# Patient Record
Sex: FEMALE | Race: WHITE | Employment: OTHER | URBAN - NONMETROPOLITAN AREA
[De-identification: names, ages, dates, MRNs, and addresses within clinical notes are randomized per-mention and may not be internally consistent; named-entity substitution may affect disease eponyms.]

---

## 2017-10-27 ENCOUNTER — APPOINTMENT (OUTPATIENT)
Dept: CT IMAGING | Age: 82
DRG: 199 | End: 2017-10-27
Payer: MEDICARE

## 2017-10-27 ENCOUNTER — HOSPITAL ENCOUNTER (INPATIENT)
Age: 82
LOS: 5 days | Discharge: NURSING, REHAB OR LONG-TERM CARE | DRG: 199 | End: 2017-11-01
Attending: FAMILY MEDICINE | Admitting: SURGERY
Payer: MEDICARE

## 2017-10-27 ENCOUNTER — APPOINTMENT (OUTPATIENT)
Dept: GENERAL RADIOLOGY | Age: 82
DRG: 199 | End: 2017-10-27
Payer: MEDICARE

## 2017-10-27 DIAGNOSIS — J44.1 COPD EXACERBATION (HCC): ICD-10-CM

## 2017-10-27 DIAGNOSIS — I48.91 NEW ONSET ATRIAL FIBRILLATION (HCC): ICD-10-CM

## 2017-10-27 DIAGNOSIS — R41.82 ALTERED MENTAL STATUS, UNSPECIFIED ALTERED MENTAL STATUS TYPE: ICD-10-CM

## 2017-10-27 DIAGNOSIS — J93.11 PRIMARY SPONTANEOUS PNEUMOTHORAX: Primary | ICD-10-CM

## 2017-10-27 LAB
ALBUMIN SERPL-MCNC: 4 GM/DL (ref 3.5–5)
ALP BLD-CCNC: 128 U/L (ref 46–116)
ALT SERPL-CCNC: 17 U/L (ref 12–78)
ANION GAP: 11 MEQ/L (ref 8–16)
AST SERPL-CCNC: 27 U/L (ref 15–37)
BASOPHILS # BLD: 0.6 % (ref 0–3)
BILIRUB SERPL-MCNC: 0.6 MG/DL (ref 0.2–1)
BUN BLDV-MCNC: 14 MG/DL (ref 7–18)
CHLORIDE BLD-SCNC: 96 MEQ/L (ref 98–107)
CO2: 29 MEQ/L (ref 21–32)
CREAT SERPL-MCNC: 1 MG/DL (ref 0.6–1.1)
EOSINOPHILS RELATIVE PERCENT: 1 % (ref 0–4)
FLU A ANTIGEN: NEGATIVE
FLU B ANTIGEN: NEGATIVE
GFR, ESTIMATED: 57 ML/MIN/1.73M2
GLUCOSE BLD-MCNC: 140 MG/DL (ref 74–106)
HCT VFR BLD CALC: 40.5 % (ref 37–47)
HEMOGLOBIN: 13 GM/DL (ref 12–16)
LACTATE: 2.7 MMOL/L (ref 0.9–1.7)
LYMPHOCYTES # BLD: 14.5 % (ref 15–47)
MAGNESIUM: 1.8 MG/DL (ref 1.8–2.4)
MCH RBC QN AUTO: 30.4 PG (ref 27–31)
MCHC RBC AUTO-ENTMCNC: 32.1 GM/DL (ref 33–37)
MCV RBC AUTO: 94.6 FL (ref 81–99)
MONOCYTES: 6.7 % (ref 0–12)
PDW BLD-RTO: 11.6 % (ref 11.5–14.5)
PLATELET # BLD: 297 THOU/MM3 (ref 130–400)
PLATELET ESTIMATE: ABNORMAL
PMV BLD AUTO: 7.8 MCM (ref 7.4–10.4)
POC CALCIUM: 9.8 MG/DL (ref 8.5–10.1)
POTASSIUM SERPL-SCNC: 4.3 MEQ/L (ref 3.5–5.1)
RBC # BLD: 4.29 MILL/MM3 (ref 4.2–5.4)
SEGS: 77.2 % (ref 43–75)
SODIUM BLD-SCNC: 136 MEQ/L (ref 136–145)
TOTAL PROTEIN: 7.9 GM/DL (ref 6.4–8.2)
TROPONIN I: 0.05 NG/ML
TSH SERPL DL<=0.05 MIU/L-ACNC: 4.8 UIU/ML (ref 0.4–4.2)
WBC # BLD: 8 THOU/MM3 (ref 4.8–10.8)

## 2017-10-27 PROCEDURE — 84484 ASSAY OF TROPONIN QUANT: CPT

## 2017-10-27 PROCEDURE — B32T1ZZ COMPUTERIZED TOMOGRAPHY (CT SCAN) OF LEFT PULMONARY ARTERY USING LOW OSMOLAR CONTRAST: ICD-10-PCS | Performed by: RADIOLOGY

## 2017-10-27 PROCEDURE — 36415 COLL VENOUS BLD VENIPUNCTURE: CPT

## 2017-10-27 PROCEDURE — 6360000002 HC RX W HCPCS: Performed by: SURGERY

## 2017-10-27 PROCEDURE — 93005 ELECTROCARDIOGRAM TRACING: CPT

## 2017-10-27 PROCEDURE — 6370000000 HC RX 637 (ALT 250 FOR IP): Performed by: SURGERY

## 2017-10-27 PROCEDURE — 84443 ASSAY THYROID STIM HORMONE: CPT

## 2017-10-27 PROCEDURE — 2580000003 HC RX 258: Performed by: SURGERY

## 2017-10-27 PROCEDURE — 71010 XR CHEST PORTABLE: CPT

## 2017-10-27 PROCEDURE — 80053 COMPREHEN METABOLIC PANEL: CPT

## 2017-10-27 PROCEDURE — 85025 COMPLETE CBC W/AUTO DIFF WBC: CPT

## 2017-10-27 PROCEDURE — 6370000000 HC RX 637 (ALT 250 FOR IP): Performed by: FAMILY MEDICINE

## 2017-10-27 PROCEDURE — 32551 INSERTION OF CHEST TUBE: CPT

## 2017-10-27 PROCEDURE — 6370000000 HC RX 637 (ALT 250 FOR IP)

## 2017-10-27 PROCEDURE — 99285 EMERGENCY DEPT VISIT HI MDM: CPT

## 2017-10-27 PROCEDURE — 99222 1ST HOSP IP/OBS MODERATE 55: CPT | Performed by: SURGERY

## 2017-10-27 PROCEDURE — 83735 ASSAY OF MAGNESIUM: CPT

## 2017-10-27 PROCEDURE — 6360000004 HC RX CONTRAST MEDICATION: Performed by: FAMILY MEDICINE

## 2017-10-27 PROCEDURE — 0W9930Z DRAINAGE OF RIGHT PLEURAL CAVITY WITH DRAINAGE DEVICE, PERCUTANEOUS APPROACH: ICD-10-PCS | Performed by: FAMILY MEDICINE

## 2017-10-27 PROCEDURE — B3201ZZ COMPUTERIZED TOMOGRAPHY (CT SCAN) OF THORACIC AORTA USING LOW OSMOLAR CONTRAST: ICD-10-PCS | Performed by: RADIOLOGY

## 2017-10-27 PROCEDURE — B32S1ZZ COMPUTERIZED TOMOGRAPHY (CT SCAN) OF RIGHT PULMONARY ARTERY USING LOW OSMOLAR CONTRAST: ICD-10-PCS | Performed by: RADIOLOGY

## 2017-10-27 PROCEDURE — 71275 CT ANGIOGRAPHY CHEST: CPT

## 2017-10-27 PROCEDURE — 87040 BLOOD CULTURE FOR BACTERIA: CPT

## 2017-10-27 PROCEDURE — 1200000003 HC TELEMETRY R&B

## 2017-10-27 PROCEDURE — 87804 INFLUENZA ASSAY W/OPTIC: CPT

## 2017-10-27 PROCEDURE — 94640 AIRWAY INHALATION TREATMENT: CPT

## 2017-10-27 PROCEDURE — 83605 ASSAY OF LACTIC ACID: CPT

## 2017-10-27 PROCEDURE — 2500000003 HC RX 250 WO HCPCS

## 2017-10-27 RX ORDER — OXYCODONE HYDROCHLORIDE AND ACETAMINOPHEN 5; 325 MG/1; MG/1
1 TABLET ORAL EVERY 4 HOURS PRN
Status: DISCONTINUED | OUTPATIENT
Start: 2017-10-27 | End: 2017-11-01 | Stop reason: HOSPADM

## 2017-10-27 RX ORDER — MECLIZINE HYDROCHLORIDE 25 MG/1
25 TABLET ORAL DAILY PRN
COMMUNITY

## 2017-10-27 RX ORDER — ASPIRIN 81 MG/1
81 TABLET, CHEWABLE ORAL DAILY
Status: DISCONTINUED | OUTPATIENT
Start: 2017-10-28 | End: 2017-11-01 | Stop reason: HOSPADM

## 2017-10-27 RX ORDER — ONDANSETRON 2 MG/ML
4 INJECTION INTRAMUSCULAR; INTRAVENOUS EVERY 6 HOURS PRN
Status: DISCONTINUED | OUTPATIENT
Start: 2017-10-27 | End: 2017-11-01 | Stop reason: HOSPADM

## 2017-10-27 RX ORDER — IPRATROPIUM BROMIDE AND ALBUTEROL SULFATE 2.5; .5 MG/3ML; MG/3ML
SOLUTION RESPIRATORY (INHALATION)
Status: COMPLETED
Start: 2017-10-27 | End: 2017-10-27

## 2017-10-27 RX ORDER — SODIUM CHLORIDE 0.9 % (FLUSH) 0.9 %
10 SYRINGE (ML) INJECTION EVERY 12 HOURS SCHEDULED
Status: DISCONTINUED | OUTPATIENT
Start: 2017-10-28 | End: 2017-11-01 | Stop reason: HOSPADM

## 2017-10-27 RX ORDER — CLOPIDOGREL BISULFATE 75 MG/1
75 TABLET ORAL DAILY
COMMUNITY

## 2017-10-27 RX ORDER — CLOPIDOGREL BISULFATE 75 MG/1
75 TABLET ORAL DAILY
Status: DISCONTINUED | OUTPATIENT
Start: 2017-10-28 | End: 2017-11-01 | Stop reason: HOSPADM

## 2017-10-27 RX ORDER — IPRATROPIUM BROMIDE AND ALBUTEROL SULFATE 2.5; .5 MG/3ML; MG/3ML
1 SOLUTION RESPIRATORY (INHALATION) ONCE
Status: COMPLETED | OUTPATIENT
Start: 2017-10-27 | End: 2017-10-29

## 2017-10-27 RX ORDER — PRAVASTATIN SODIUM 80 MG/1
80 TABLET ORAL DAILY
COMMUNITY

## 2017-10-27 RX ORDER — 0.9 % SODIUM CHLORIDE 0.9 %
10 VIAL (ML) INJECTION DAILY
Status: DISCONTINUED | OUTPATIENT
Start: 2017-10-28 | End: 2017-10-31

## 2017-10-27 RX ORDER — LYSINE, LEUCINE, PHENYLALANINE, VALINE, HISTIDINE, ISOLEUCINE, METHIONINE, THREONINE, TRYPTOPHAN, ALANINE, ARGININE, GLYCINE, PROLINE, GLUTAMIC ACID, SERINE, ASPARTIC ACID, TYROSINE 1.18; 1.04; 1.04; 960; 894; 749; 749; 749; 250; 2.17; 1.47; 1.04; 894; 749; 592; 434; 39 G/100ML; G/100ML; G/100ML; MG/100ML; MG/100ML; MG/100ML; MG/100ML; MG/100ML; MG/100ML; G/100ML; G/100ML; G/100ML; MG/100ML; MG/100ML; MG/100ML; MG/100ML; MG/100ML
INJECTION, SOLUTION INTRAVENOUS
Status: ON HOLD | COMMUNITY
End: 2017-10-27

## 2017-10-27 RX ORDER — PANTOPRAZOLE SODIUM 40 MG/10ML
40 INJECTION, POWDER, LYOPHILIZED, FOR SOLUTION INTRAVENOUS DAILY
Status: DISCONTINUED | OUTPATIENT
Start: 2017-10-28 | End: 2017-10-31

## 2017-10-27 RX ORDER — NICOTINE 21 MG/24HR
1 PATCH, TRANSDERMAL 24 HOURS TRANSDERMAL DAILY
Status: DISCONTINUED | OUTPATIENT
Start: 2017-10-28 | End: 2017-11-01 | Stop reason: HOSPADM

## 2017-10-27 RX ORDER — MORPHINE SULFATE 2 MG/ML
2 INJECTION, SOLUTION INTRAMUSCULAR; INTRAVENOUS EVERY 4 HOURS PRN
Status: DISCONTINUED | OUTPATIENT
Start: 2017-10-27 | End: 2017-11-01 | Stop reason: HOSPADM

## 2017-10-27 RX ORDER — SODIUM CHLORIDE 9 MG/ML
INJECTION, SOLUTION INTRAVENOUS CONTINUOUS
Status: DISCONTINUED | OUTPATIENT
Start: 2017-10-27 | End: 2017-11-01

## 2017-10-27 RX ORDER — BUSPIRONE HYDROCHLORIDE 10 MG/1
10 TABLET ORAL 2 TIMES DAILY
COMMUNITY

## 2017-10-27 RX ORDER — SODIUM CHLORIDE 0.9 % (FLUSH) 0.9 %
10 SYRINGE (ML) INJECTION PRN
Status: DISCONTINUED | OUTPATIENT
Start: 2017-10-27 | End: 2017-11-01 | Stop reason: HOSPADM

## 2017-10-27 RX ORDER — NITROGLYCERIN 0.4 MG/1
0.4 TABLET SUBLINGUAL EVERY 5 MIN PRN
Status: DISCONTINUED | OUTPATIENT
Start: 2017-10-27 | End: 2017-11-01 | Stop reason: HOSPADM

## 2017-10-27 RX ORDER — ACETAMINOPHEN 325 MG/1
650 TABLET ORAL EVERY 4 HOURS PRN
Status: DISCONTINUED | OUTPATIENT
Start: 2017-10-27 | End: 2017-10-27 | Stop reason: HOSPADM

## 2017-10-27 RX ORDER — LORATADINE 10 MG/1
10 CAPSULE, LIQUID FILLED ORAL DAILY
COMMUNITY

## 2017-10-27 RX ORDER — NITROGLYCERIN 0.4 MG/1
0.4 TABLET SUBLINGUAL EVERY 5 MIN PRN
COMMUNITY

## 2017-10-27 RX ORDER — LEVOTHYROXINE SODIUM 0.03 MG/1
25 TABLET ORAL DAILY
COMMUNITY

## 2017-10-27 RX ORDER — BUSPIRONE HYDROCHLORIDE 10 MG/1
10 TABLET ORAL 2 TIMES DAILY
Status: DISCONTINUED | OUTPATIENT
Start: 2017-10-28 | End: 2017-11-01 | Stop reason: HOSPADM

## 2017-10-27 RX ORDER — IPRATROPIUM BROMIDE AND ALBUTEROL SULFATE 2.5; .5 MG/3ML; MG/3ML
1 SOLUTION RESPIRATORY (INHALATION) ONCE
Status: COMPLETED | OUTPATIENT
Start: 2017-10-27 | End: 2017-10-27

## 2017-10-27 RX ORDER — ACETAMINOPHEN 325 MG/1
650 TABLET ORAL EVERY 4 HOURS PRN
Status: DISCONTINUED | OUTPATIENT
Start: 2017-10-27 | End: 2017-11-01 | Stop reason: HOSPADM

## 2017-10-27 RX ORDER — PRAVASTATIN SODIUM 80 MG/1
80 TABLET ORAL DAILY
Status: DISCONTINUED | OUTPATIENT
Start: 2017-10-28 | End: 2017-11-01 | Stop reason: HOSPADM

## 2017-10-27 RX ORDER — MECLIZINE HCL 12.5 MG/1
25 TABLET ORAL DAILY PRN
Status: DISCONTINUED | OUTPATIENT
Start: 2017-10-27 | End: 2017-11-01 | Stop reason: HOSPADM

## 2017-10-27 RX ADMIN — IPRATROPIUM BROMIDE AND ALBUTEROL SULFATE 3 ML: .5; 3 SOLUTION RESPIRATORY (INHALATION) at 13:42

## 2017-10-27 RX ADMIN — LIDOCAINE HYDROCHLORIDE 30 ML: 10; .005 INJECTION, SOLUTION EPIDURAL; INFILTRATION; INTRACAUDAL; PERINEURAL at 16:00

## 2017-10-27 RX ADMIN — ACETAMINOPHEN 650 MG: 325 TABLET ORAL at 21:37

## 2017-10-27 RX ADMIN — SODIUM CHLORIDE: 9 INJECTION, SOLUTION INTRAVENOUS at 21:27

## 2017-10-27 RX ADMIN — IPRATROPIUM BROMIDE AND ALBUTEROL SULFATE 1 AMPULE: .5; 3 SOLUTION RESPIRATORY (INHALATION) at 14:36

## 2017-10-27 RX ADMIN — ENOXAPARIN SODIUM 30 MG: 30 INJECTION SUBCUTANEOUS at 23:12

## 2017-10-27 RX ADMIN — IOPAMIDOL 100 ML: 755 INJECTION, SOLUTION INTRAVENOUS at 14:24

## 2017-10-27 ASSESSMENT — ENCOUNTER SYMPTOMS
ABDOMINAL PAIN: 0
EYE DISCHARGE: 0
COUGH: 1
VOMITING: 0
SHORTNESS OF BREATH: 1
NAUSEA: 0
RHINORRHEA: 0
BACK PAIN: 0
SORE THROAT: 0
EYE PAIN: 0
DIARRHEA: 0
WHEEZING: 0

## 2017-10-27 ASSESSMENT — PAIN SCALES - GENERAL
PAINLEVEL_OUTOF10: 0
PAINLEVEL_OUTOF10: 3

## 2017-10-27 NOTE — ED PROVIDER NOTES
[facility]  eMERGENCY dEPARTMENT eNCOUnter          CHIEF COMPLAINT       Chief Complaint   Patient presents with    Shortness of Breath       Nurses Notes reviewed and I agree except as noted in the HPI. HISTORY OF PRESENT ILLNESS    Manuel Mcbride is a [de-identified] y.o. female who presents chief complaint of shortness of breath, weakness, fever, chills, subjective, productive cough ongoing for about 2 days. Of note patient does continue to smoke. REVIEW OF SYSTEMS     Review of Systems   Constitutional: Positive for chills and fever. Negative for fatigue. HENT: Negative for ear pain, rhinorrhea and sore throat. Eyes: Negative for pain, discharge and visual disturbance. Respiratory: Positive for cough and shortness of breath. Negative for wheezing. Cardiovascular: Negative for chest pain, palpitations and leg swelling. Gastrointestinal: Negative for abdominal pain, diarrhea, nausea and vomiting. Genitourinary: Negative for difficulty urinating, dysuria, pelvic pain and vaginal discharge. Musculoskeletal: Negative for arthralgias, back pain, joint swelling and neck pain. Skin: Negative for rash. Allergic/Immunologic: Negative for environmental allergies. Neurological: Positive for weakness. Negative for dizziness, seizures, syncope and headaches. Hematological: Negative for adenopathy. Psychiatric/Behavioral: Negative for dysphoric mood and suicidal ideas. The patient is not nervous/anxious. PAST MEDICAL HISTORY    has a past medical history of CAD (coronary artery disease). SURGICAL HISTORY      has a past surgical history that includes Coronary angioplasty with stent.     CURRENT MEDICATIONS       Previous Medications    AMINO ACID INFUSION (AMINO ACIDS) 15 % INFUSION    Infuse intravenously    ASPIRIN 81 MG TABLET    Take 81 mg by mouth daily    BUSPIRONE (BUSPAR) 10 MG TABLET    Take 10 mg by mouth 3 times daily    CLOPIDOGREL (PLAVIX) 75 MG TABLET    Take 75 mg by mouth daily    LEVOTHYROXINE (SYNTHROID) 25 MCG TABLET    Take 25 mcg by mouth Daily    LORATADINE (CLARITIN) 10 MG CAPSULE    Take 10 mg by mouth daily    PRAVASTATIN (PRAVACHOL) 80 MG TABLET    Take 80 mg by mouth daily       ALLERGIES     has No Known Allergies. FAMILY HISTORY     has no family status information on file. family history is not on file. SOCIAL HISTORY      reports that she has been smoking. She has never used smokeless tobacco.    PHYSICAL EXAM     INITIAL VITALS:  oral temperature is 97.9 °F (36.6 °C). Her blood pressure is 123/53 (abnormal) and her pulse is 82. Her respiration is 28 and oxygen saturation is 99%. Physical Exam   Constitutional: She is oriented to person, place, and time. She appears well-developed and well-nourished. HENT:   Head: Normocephalic and atraumatic. Right Ear: External ear normal.   Left Ear: External ear normal.   Eyes: Right eye exhibits no discharge. Left eye exhibits no discharge. No scleral icterus. Neck: Normal range of motion. No JVD present. No tracheal deviation present. No thyromegaly present. Cardiovascular: Normal rate and regular rhythm. Exam reveals no gallop and no friction rub. No murmur heard. Pulmonary/Chest: Effort normal and breath sounds normal. No stridor. No respiratory distress. She has no wheezes. She has no rales. Decreased air entry on the right   Abdominal: Soft. She exhibits no distension. There is no tenderness. There is no rebound and no guarding. Musculoskeletal: She exhibits no edema or tenderness. Neurological: She is alert and oriented to person, place, and time. Coordination normal.   Skin: Skin is warm and dry. No rash (On exposed body surfaces) noted. She is not diaphoretic. Psychiatric: She has a normal mood and affect.  Her behavior is normal. Thought content normal.       DIFFERENTIAL DIAGNOSIS:   COPD, ACS, congestive heart failure, pneumonia    DIAGNOSTIC RESULTS     EKG: All EKG's are interpreted by the Emergency Department Physician who either signs or Co-signs this chart in the absence of a cardiologist.  EKG    The patient had an EKG which is interpreted by me in the absence of a Cardiologist.   [x] Without comparison to previous. [] With comparison to a previous   A. Fib, rate controlled, ventricular rate 84 bpm, PVCs  RADIOLOGY: non-plain film images(s) such as CT, Ultrasound and MRI are read by the radiologist.  The patient had a Cta Chest W Wo Contrast    Result Date: 10/27/2017  PROCEDURE: CTA CHEST W WO CONTRAST CLINICAL INFORMATION: DYSPNEA, ON EXERTION,  shortness of breath and wheezing which began yesterday COMPARISON: No prior study. TECHNIQUE: 5 mm axial images were obtained through the chest after the administration of 80 mL Isovue-370 IV contrast.  A non-contrast localizer was obtained. MIP coronal and sagittal reconstructed images of the chest were also obtained. All CT scans at this facility use dose modulation, iterative reconstruction, and/or weight-based dosing when appropriate to reduce radiation dose to as low as reasonably achievable. FINDINGS: There is no evidence of pulmonary embolism. The pulmonary arteries are borderline prominent in size, measuring up to 2.6 cm in diameter. There is no thoracic aortic aneurysm or dissection. The heart is not enlarged. There is no pericardial effusion. There are calcified mediastinal lymph nodes. The tracheobronchial tree is patent. Lung windows reveal a large right pneumothorax, estimated at approximately 50 percent. Extensive background emphysematous changes are noted bilaterally most severe at the upper lobes. There is a thoracic dextroscoliosis. There are degenerative changes throughout the spine. There are prominent Schmorl's nodes. There is a probable bone island to the left of midline at the level of T4.     1. Large right pneumothorax, estimated at approximately 50 percent. 2. No evidence of pulmonary embolism.  3. Severe bilateral emphysematous lung changes. 4. These results were called to Dr. Matthew Sarmiento at the [facility] emergency department on 10/27/2017 at approximately 1515 hours. **This report has been created using voice recognition software. It may contain minor errors which are inherent in voice recognition technology. ** Final report electronically signed by Dr. Eloy Meeks on 10/27/2017 3:15 PM    Xr Chest Portable    Result Date: 10/27/2017  PROCEDURE: XR CHEST PORTABLE CLINICAL INFORMATION: 50% pneumothorax status post chest tube placement,  . COMPARISON: 9/20/2014 and CT chest 10/27/2017 TECHNIQUE: 2 portable upright images FINDINGS: Right thoracotomy tube has been placed. There is currently no evidence of a right-sided pneumothorax. Lungs are severely emphysematous. Chronic postinflammatory changes are seen in the Hilar distribution. Heart size is normal. Pulmonary vessels are not congested. Bony thorax is osteopenic. No pneumothorax post right thoracotomy tube placement **This report has been created using voice recognition software. It may contain minor errors which are inherent in voice recognition technology. ** Final report electronically signed by Dr. Lucho Walter on 10/27/2017 4:55 PM  [x] Visualized and interpreted by me   [x] Radiologist's Wet Read Report Reviewed   [] Discussed with Radiologist.    LABS:   Labs Reviewed   CBC WITH AUTO DIFFERENTIAL - Abnormal; Notable for the following:        Result Value    MCHC 32.1 (*)     SEGS 77.2 (*)     Lymphocytes 14.5 (*)     All other components within normal limits   COMPREHENSIVE METABOLIC PANEL - Abnormal; Notable for the following:     Glucose 140 (*)     Chloride 96 (*)     Alkaline Phosphatase 128 (*)     All other components within normal limits   LACTIC ACID - Abnormal; Notable for the following:     Lactate 2.70 (*)     All other components within normal limits   GLOMERULAR FILTRATION RATE, ESTIMATED - Abnormal; Notable for the following:     GFR,

## 2017-10-27 NOTE — Clinical Note
Patient Class: Inpatient [101]   REQUIRED: Diagnosis: Pneumothorax [047750]   Estimated Length of Stay: Estimated stay of more than 2 midnights   Future Attending Provider: Nolberto Nath [[id number]]   Telemetry Bed Required?: Yes

## 2017-10-27 NOTE — ED NOTES
Explained chest tube to pt. Pt agrees yet states nobody explained it to her yet. Explained the doctor was just in and explained the procedure. Talked to pt's children whom also signed the consent and stated they've been through this before and had no further questions.       Rollene Prader, RN  10/27/17 [id number]

## 2017-10-27 NOTE — ED NOTES
Pt is pink, warm and dry, breathing with ease. Infrequent moist cough noted. Chest tube is intact to right lateral chest to low wall suction. Small amount of blood in tubing. Pt denies pain at insertion site unless she moves. No edema. Oxygen on at 2L/NC. Monitor showing NSR. INT intact. Pt transported to [facility] per EMS in stable condition. Pt's children were going to meet her at the hospital in [address] [facility].       Concha Quinn RN  10/27/17 0275

## 2017-10-28 ENCOUNTER — APPOINTMENT (OUTPATIENT)
Dept: GENERAL RADIOLOGY | Age: 82
DRG: 199 | End: 2017-10-28
Payer: MEDICARE

## 2017-10-28 PROBLEM — E43 SEVERE MALNUTRITION (HCC): Chronic | Status: ACTIVE | Noted: 2017-10-28

## 2017-10-28 LAB
ANION GAP SERPL CALCULATED.3IONS-SCNC: 11 MEQ/L (ref 8–16)
BASOPHILS # BLD: 0.9 %
BASOPHILS ABSOLUTE: 0.1 THOU/MM3 (ref 0–0.1)
BUN BLDV-MCNC: 11 MG/DL (ref 7–22)
CALCIUM SERPL-MCNC: 8.8 MG/DL (ref 8.5–10.5)
CHLORIDE BLD-SCNC: 98 MEQ/L (ref 98–111)
CO2: 28 MEQ/L (ref 23–33)
CREAT SERPL-MCNC: 0.8 MG/DL (ref 0.4–1.2)
EKG ATRIAL RATE: 166 BPM
EKG Q-T INTERVAL: 362 MS
EKG QRS DURATION: 66 MS
EKG QTC CALCULATION (BAZETT): 427 MS
EKG R AXIS: 88 DEGREES
EKG T AXIS: 79 DEGREES
EKG VENTRICULAR RATE: 84 BPM
EOSINOPHIL # BLD: 1.2 %
EOSINOPHILS ABSOLUTE: 0.1 THOU/MM3 (ref 0–0.4)
GFR SERPL CREATININE-BSD FRML MDRD: 69 ML/MIN/1.73M2
GLUCOSE BLD-MCNC: 69 MG/DL (ref 70–108)
HCT VFR BLD CALC: 32 % (ref 37–47)
HEMOGLOBIN: 10.8 GM/DL (ref 12–16)
LYMPHOCYTES # BLD: 14.1 %
LYMPHOCYTES ABSOLUTE: 1.1 THOU/MM3 (ref 1–4.8)
MCH RBC QN AUTO: 32.5 PG (ref 27–31)
MCHC RBC AUTO-ENTMCNC: 33.8 GM/DL (ref 33–37)
MCV RBC AUTO: 96 FL (ref 81–99)
MONOCYTES # BLD: 11 %
MONOCYTES ABSOLUTE: 0.9 THOU/MM3 (ref 0.4–1.3)
NUCLEATED RED BLOOD CELLS: 0 /100 WBC
PDW BLD-RTO: 13.6 % (ref 11.5–14.5)
PLATELET # BLD: 220 THOU/MM3 (ref 130–400)
PMV BLD AUTO: 7.8 MCM (ref 7.4–10.4)
POTASSIUM SERPL-SCNC: 3.9 MEQ/L (ref 3.5–5.2)
RBC # BLD: 3.34 MILL/MM3 (ref 4.2–5.4)
SEG NEUTROPHILS: 72.8 %
SEGMENTED NEUTROPHILS ABSOLUTE COUNT: 5.7 THOU/MM3 (ref 1.8–7.7)
SODIUM BLD-SCNC: 137 MEQ/L (ref 135–145)
WBC # BLD: 7.8 THOU/MM3 (ref 4.8–10.8)

## 2017-10-28 PROCEDURE — 1200000003 HC TELEMETRY R&B

## 2017-10-28 PROCEDURE — 2580000003 HC RX 258: Performed by: SURGERY

## 2017-10-28 PROCEDURE — 36415 COLL VENOUS BLD VENIPUNCTURE: CPT

## 2017-10-28 PROCEDURE — 6370000000 HC RX 637 (ALT 250 FOR IP): Performed by: NURSE PRACTITIONER

## 2017-10-28 PROCEDURE — 80048 BASIC METABOLIC PNL TOTAL CA: CPT

## 2017-10-28 PROCEDURE — 85025 COMPLETE CBC W/AUTO DIFF WBC: CPT

## 2017-10-28 PROCEDURE — 94640 AIRWAY INHALATION TREATMENT: CPT

## 2017-10-28 PROCEDURE — 6360000002 HC RX W HCPCS: Performed by: SURGERY

## 2017-10-28 PROCEDURE — 99233 SBSQ HOSP IP/OBS HIGH 50: CPT | Performed by: SURGERY

## 2017-10-28 PROCEDURE — 71010 XR CHEST LIMITED ONE VIEW: CPT

## 2017-10-28 PROCEDURE — 6370000000 HC RX 637 (ALT 250 FOR IP): Performed by: SURGERY

## 2017-10-28 PROCEDURE — 99999 PR OFFICE/OUTPT VISIT,PROCEDURE ONLY: CPT | Performed by: NURSE PRACTITIONER

## 2017-10-28 PROCEDURE — C9113 INJ PANTOPRAZOLE SODIUM, VIA: HCPCS | Performed by: SURGERY

## 2017-10-28 RX ORDER — IPRATROPIUM BROMIDE AND ALBUTEROL SULFATE 2.5; .5 MG/3ML; MG/3ML
1 SOLUTION RESPIRATORY (INHALATION)
Status: DISCONTINUED | OUTPATIENT
Start: 2017-10-28 | End: 2017-11-01 | Stop reason: HOSPADM

## 2017-10-28 RX ADMIN — Medication 10 ML: at 07:42

## 2017-10-28 RX ADMIN — IPRATROPIUM BROMIDE AND ALBUTEROL SULFATE 1 AMPULE: .5; 3 SOLUTION RESPIRATORY (INHALATION) at 21:11

## 2017-10-28 RX ADMIN — OXYCODONE HYDROCHLORIDE AND ACETAMINOPHEN 1 TABLET: 5; 325 TABLET ORAL at 01:17

## 2017-10-28 RX ADMIN — IPRATROPIUM BROMIDE AND ALBUTEROL SULFATE 1 AMPULE: .5; 3 SOLUTION RESPIRATORY (INHALATION) at 16:01

## 2017-10-28 RX ADMIN — CLOPIDOGREL 75 MG: 75 TABLET, FILM COATED ORAL at 09:56

## 2017-10-28 RX ADMIN — PANTOPRAZOLE SODIUM 40 MG: 40 INJECTION, POWDER, FOR SOLUTION INTRAVENOUS at 07:42

## 2017-10-28 RX ADMIN — OXYCODONE HYDROCHLORIDE AND ACETAMINOPHEN 1 TABLET: 5; 325 TABLET ORAL at 21:02

## 2017-10-28 RX ADMIN — BUSPIRONE HYDROCHLORIDE 10 MG: 10 TABLET ORAL at 09:56

## 2017-10-28 RX ADMIN — IPRATROPIUM BROMIDE AND ALBUTEROL SULFATE 1 AMPULE: .5; 3 SOLUTION RESPIRATORY (INHALATION) at 12:16

## 2017-10-28 RX ADMIN — ENOXAPARIN SODIUM 30 MG: 30 INJECTION SUBCUTANEOUS at 21:02

## 2017-10-28 RX ADMIN — BUSPIRONE HYDROCHLORIDE 10 MG: 10 TABLET ORAL at 21:09

## 2017-10-28 RX ADMIN — ASPIRIN 81 MG: 81 TABLET, CHEWABLE ORAL at 09:56

## 2017-10-28 RX ADMIN — PRAVASTATIN SODIUM 80 MG: 80 TABLET ORAL at 21:09

## 2017-10-28 RX ADMIN — SODIUM CHLORIDE: 9 INJECTION, SOLUTION INTRAVENOUS at 17:06

## 2017-10-28 ASSESSMENT — PAIN SCALES - GENERAL
PAINLEVEL_OUTOF10: 6
PAINLEVEL_OUTOF10: 6

## 2017-10-28 NOTE — H&P
[address] [place] [address]                      PREOPERATIVE HISTORY AND PHYSICAL    PATIENT NAME: Nash Dakins                     :             1935  MED REC NO:   [id number]                            ROOM:            9204  ACCOUNT NO:   [de-identified]                            ADMISSION DATE:  10/27/2017  PROVIDER:     Sherry Forte. Chase Jones MD      CHIEF COMPLAINT:  Right pneumothorax. HISTORY OF PRESENT ILLNESS:  The patient is an 72-year-old white  female who presented to the [facility] with shortness of  breath and was found to have a right pneumothorax at approximately 50%  and a chest tube was placed per Dr. Liang Bonilla. I was called for  consultation and admission and once the patient got here and I was  talking with her, apparently she did fall the day before. She lives  by herself. She denies that she had a syncopal episode, but she does  not exactly remember what happened. She fell from a standing position  to the floor. She states she thinks she was out for just a little  bit, but then got herself up. She denies any recent falling, denies  any syncope, but also then was complaining of right shoulder  discomfort. She states that she was able to \"get her shoulder back  in\" and then had onset of shortness of breath today and was taken to  the [facility] by a nephew. When asked exactly what she  meant by getting her shoulder back in or if this had happened before,  she really denies. Nonetheless, she is being admitted for what could  be a traumatic pneumothorax or possibly just a spontaneous  pneumothorax as she is a heavy smoker and has known COPD. PAST MEDICAL HISTORY:  Positive for coronary artery disease,  hypertension, hypothyroidism, COPD. She denies any diabetes. PAST SURGICAL HISTORY:  Includes a coronary angioplasty with stent,  otherwise no abdominal surgeries.   No orthopedic surgeries. SOCIAL HISTORY:  She is a smoker and states she has been smoking since  the age of 27. She denies any significant alcohol use. FAMILY HISTORY:  Really noncontributory. ALLERGIES:  None. MEDICATIONS:  Aspirin, BuSpar, Plavix, levothyroxine, Claritin,  pravastatin (Pravachol). SOCIAL HISTORY:  As mentioned. She lives by herself. GYN:  She is P5, G6, AB zero. REVIEW OF SYSTEMS:  A 10-point review of systems is otherwise negative  except as mentioned above. PHYSICAL EXAMINATION:  GENERAL:  The patient is an 74-year-old white female. She is awake,  alert. She has two daughters present in the room. She has nasal  cannula in place chest tube is in the right chest wall. The patient  is very slender, almost cachectic in appearance. HEAD, EARS, EYES, NOSE AND THROAT:  She does have glasses on. Pupils  are equal.  EOMs are intact. Sclerae is clear. TMJs are normal.   Dentition is rather poor and missing several teeth. NECK:  She has sunken in supraclavicular areas. Neck is otherwise  soft and no pain to palpation along the posterior spine, good range of  motion. CARDIAC:  S1, S2. No murmurs or bruits, but it is distant. RESPIRATIONS:  Also are distant on each side. The chest tube is in  place in the right side there is some mild crepitus of the chest wall. ABDOMEN:  Soft. Bowel sounds are positive. No palpable masses. Groins show good femoral pulses. EXTREMITIES:  Upper extremities, she has good radial pulses  bilaterally. Again, is quite slender no long bone deformities. Lower  extremities:  She has a rather poor hygiene of the toes, otherwise  good DP pulses. There is no swelling of the lower legs. No palpable  abnormality. LABORATORY DATA:  White count of 8, hemoglobin of 13. She had a  sodium 136, potassium 4.3, BUN of 14, creatinine of 1. The patient  did have mild elevation a lactate at 2.70. LFTs were normal except  for an alk phos of 128.   Thyroid revealed a TSH of 4.8. Chest x-ray reported as one status post chest tube placement. Apparently there is 50% pneumothorax. CT of the chest was done I  guess prior to the chest tube placement and that is what revealed 50%  pneumothorax. No other evidence of pulmonary embolism. Again severe  bilateral emphysematous lung changes were seen. ASSESSMENT/PLAN:  1. Right pneumothorax, difficult to know if this is spontaneous or  possibly from a fall 24 hours prior. We will maintain chest tube to  suction. 2.  Coronary artery disease. 3.  Hypothyroidism. It should be noted that the TSH was somewhat  elevated, indicates that she may not be taking her Synthroid  appropriately. 4.  COPD. We will further discuss with the patient and family. Thank you very much. TITO CHENEY [facility], MD    D: 10/27/2017 23:41:03       T: 10/27/2017 23:44:55     [email or web address]  Job#: [id number]     Doc#: [id number]

## 2017-10-28 NOTE — PLAN OF CARE
Problem: Falls - Risk of  Goal: Absence of falls  No falls this shift  Bedside commode     Problem: Risk for Impaired Skin Integrity  Goal: Tissue integrity - skin and mucous membranes  Structural intactness and normal physiological function of skin and  mucous membranes. No new skin breakdown this shift    Problem: Pain:  Goal: Pain level will decrease  Pain level will decrease   PRN pain medication given as needed  Goal: Control of acute pain  Control of acute pain   Pain at chest tube site    Problem: OXYGENATION/RESPIRATORY FUNCTION  Goal: Chest tube drainage within specified parameters  Chest tube assessed this fit  Goal: Adequate oxygenation  On 2L nasal cannula    Comments: Care plan reviewed with patient. Patient verbalizes understanding of the plan of care and contributes to goal setting.

## 2017-10-28 NOTE — PLAN OF CARE
Problem: Nutrition  Goal: Optimal nutrition therapy  Outcome: Ongoing  Nutrition Problem: Severe malnutrition, in context of chronic illness  Intervention: Food and/or Nutrient Delivery: Continue current diet, Start ONS (recommend multivitamin)  Nutritional Goals: Patient will consume/tolerate 50% or greater of meals and ONS during LOS

## 2017-10-28 NOTE — FLOWSHEET NOTE
10/27/17 2028   Provider Notification   Reason for Communication Evaluate   Provider Name Dr Tramaine Jensen   Provider Notification Physician   Method of Communication Call   Response See orders   Notification Time 2028   Dr Tramaine Jensen informed that patient has arrived to floor

## 2017-10-28 NOTE — PROGRESS NOTES
Pharmacy Renal Adjustment Consult    Mason Arreguin is a [de-identified] y.o. female. Pharmacy has been consulted to renally adjust Lovenox per P&T protocol. Recent Labs      10/27/17   1330   BUN  14       Recent Labs      10/27/17   1330   CREATININE  1.0       CrCl cannot be calculated (Unknown ideal weight.). Height:   Ht Readings from Last 1 Encounters:   10/27/17 5' 7\" (1.702 m)     Weight:  Wt Readings from Last 1 Encounters:   10/27/17 85 lb 4.8 oz (38.7 kg)       CKD stage:          Baseline SCr:     Plan: Adjust the following medications based on renal function:  Decrease Lovenox to 30 mg SubQ q24h.   Charlotte Sanchez 10/27/2017 9:39 PM

## 2017-10-28 NOTE — PROGRESS NOTES
Nutrition Assessment    Type and Reason for Visit: Initial, Consult, Positive Nutrition Screen (Consult: nutrition evaluation. + screen: weight loss, poor po, no pork/hamburger, wound (stage 2 on coccyx))    Nutrition Recommendations: Continue current diet. RD starting ONS TID. Recommend multivitamin. Malnutrition Assessment:  · Malnutrition Status: Meets the criteria for severe malnutrition  · Context: Chronic illness  · Findings of the 6 clinical characteristics of malnutrition (Minimum of 2 out of 6 clinical characteristics is required to make the diagnosis of moderate or severe Protein Calorie Malnutrition based on AND/ASPEN Guidelines):  1. Weight Loss-5% loss or greater, in 1 year  2. Fat Loss-Severe subcutaneous fat loss, Orbital, Triceps  3. Muscle Loss-Severe muscle mass loss, Clavicles (pectoralis and deltoids)    Nutrition Diagnosis:   · Problem: Severe malnutrition, in context of chronic illness  · Etiology: related to Impaired respiratory function-inability to consume food     Signs and symptoms:  as evidenced by Severe loss of subcutaneous fat, Severe muscle loss    Nutrition Assessment:  · Subjective Assessment: Patient admitted with pneumonothorax. No weight records available. Patient seen and reports: good appetite (normal), weight was ~# about 1 year ago, does not eat eggs (just a preferences: patient will missing teeth but denies chewing difficulty, she grew up having to clean them and just cannot eat them now), pork chops OK. Patient says that she just started drinking ensure at home and likes it, patient agrees to drink ensure while here. Patient states that her grandson's wife does her grocery shopping for her and picks up new items (ensure) for her to try and she appreciates it. I provided patient with menu and ordered lunch for her according to her preferences.     · Wound Type: None  · Current Nutrition Therapies:  · Oral Diet Orders: General   · Oral Diet intake:  (good per patient)  · Oral Nutrition Supplement (ONS) Orders: Standard High Calorie Oral Supplement (ensure enlive TID (chocolate))  · ONS intake: Unable to assess (new order)  · Anthropometric Measures:  · Ht: 5' 7\" (170.2 cm)   · Current Body Wt: 85 lb (38.6 kg)  · Admission Body Wt: 85 lb (38.6 kg)  · Usual Body Wt:  (# about 1 year ago per patient; no weight records on file)  · % Weight Change: ~7# (7%) loss (based on usual weight of 97#),  1 year  · Ideal Body Wt: 135 lb (61.2 kg), % Ideal Body 63%  · Adjusted Body Wt: 98 lb (44.5 kg), body weight adjusted for  (underweight)  · BMI Classification: BMI <18.5 Underweight (13.4)  · Comparative Standards (Estimated Nutrition Needs):  · Estimated Daily Total Kcal: 1172-1069  · Estimated Daily Protein (g): 53-62    Estimated Intake vs Estimated Needs: Insufficient Data    Nutrition Risk Level: High    Nutrition Interventions:   Continue current diet, Start ONS (recommend multivitamin)  Continued Inpatient Monitoring, Education Not Indicated    Nutrition Evaluation:   · Evaluation: Goals set   · Goals: Patient will consume/tolerate 50% or greater of meals and ONS during LOS    · Monitoring: Meal Intake, Supplement Intake, Diet Tolerance, Skin Integrity, Weight, Comparative Standards, Pertinent Labs    See Adult Nutrition Doc Flowsheet for more detail.      Electronically signed by Anderson Woodward RD, LD on 10/28/17 at 12:27 PM    Contact Number: [phone]

## 2017-10-28 NOTE — PROGRESS NOTES
General Surgery  Dr Mini Geller  Daily Progress Note      Patient:  Eduar Alas      Unit/Bed:4K-27/027-A    YOB: 1935    MRN: [id number]     Acct: [de-identified]     Admit date: 10/27/2017    Subjective: patient states  feels better today, less SOB. Denies chest pain. No new concerns  She is alert and oriented, pleasantly confused at times    Patient states she has a pacemaker approximately 6 years ago in [facility], she does not remember for sure, she forgets about it, states that she does not think she has ever gotten it checked     All other ROS negative except noted in HPI      Patient Seen, Chart, Consults notes, Labs, Radiology studies reviewed. Past, Family, Social History unchanged from admission.     Diet:  DIET GENERAL;  Dietary Nutrition Supplements: Standard High Calorie Oral Supplement    Medications:  Scheduled Meds:   ipratropium-albuterol  1 ampule Inhalation Q4H WA    ipratropium-albuterol  1 ampule Inhalation Once    enoxaparin  30 mg Subcutaneous Q24H    pneumococcal 13-valent conjugate  0.5 mL Intramuscular Once    aspirin  81 mg Oral Daily    busPIRone  10 mg Oral BID    clopidogrel  75 mg Oral Daily    pravastatin  80 mg Oral Daily    sodium chloride flush  10 mL Intravenous 2 times per day    pantoprazole  40 mg Intravenous Daily    And    sodium chloride (PF)  10 mL Intravenous Daily    nicotine  1 patch Transdermal Daily     Continuous Infusions:   sodium chloride 50 mL/hr at 10/27/17 2254     PRN Meds:meclizine, nitroGLYCERIN, sodium chloride flush, acetaminophen, ondansetron, morphine, oxyCODONE-acetaminophen    Objective:    CBC:   Recent Labs      10/27/17   1330  10/28/17   0529   WBC  8.0  7.8   HGB  13.0  10.8*   PLT  297  220     BMP:    Recent Labs      10/27/17   1330  10/28/17   0529   NA  136  137   K  4.3  3.9   CL  96*  98   CO2  29  28   BUN  14  11   CREATININE  1.0  0.8   GLUCOSE  140*  69*     Calcium:  Recent Labs      10/28/17   0529 CALCIUM  8.8     Ionized Calcium:No results for input(s): IONCA in the last 72 hours. Magnesium:  Recent Labs      10/27/17   1330   MG  1.8     Phosphorus:No results for input(s): PHOS in the last 72 hours. BNP:No results for input(s): BNP in the last 72 hours. Glucose:No results for input(s): POCGLU in the last 72 hours. HgbA1C: No results for input(s): LABA1C in the last 72 hours. INR: No results for input(s): INR in the last 72 hours. Hepatic:   Recent Labs      10/27/17   1330   ALKPHOS  128*   ALT  17   AST  27   PROT  7.9   BILITOT  0.6   LABALBU  4.0     Amylase and Lipase:No results for input(s): LACTA, AMYLASE in the last 72 hours. Lactic Acid: No results for input(s): LACTA in the last 72 hours. Troponin: No results for input(s): CKTOTAL, CKMB, TROPONINT in the last 72 hours. BNP: No results for input(s): BNP in the last 72 hours. Lipids: No results for input(s): CHOL, TRIG, HDL, LDLCALC in the last 72 hours. Invalid input(s): LDL  ABGs: No results found for: PH, PCO2, PO2, HCO3, O2SAT    Radiology reports as per the Radiologist    Chest X Ray 1VW   10/28/17  PROCEDURE: XR CHEST LIMITED ONE VIEW       CLINICAL INFORMATION: R Pneumo,         COMPARISON: 10/27/2017       TECHNIQUE:  AP mobile chest         FINDINGS: The cardiomediastinal silhouette appears within normal limits. There is a large-bore right-sided chest tube present with the tip overlying the right upper lung zone. There is possible tiny right apical pneumothorax. Emphysema along the right    lateral chest wall. No pneumothorax is seen. There is hyperinflation noted. There is a calcified granuloma overlying the left lung base laterally.           Impression   1. Possible tiny right apical pneumothorax. Otherwise stable portable chest with persistent hyperinflation.  Large bore right-sided chest tube appears unchanged.               **This report has been created using voice recognition software.  It may contain minor errors which

## 2017-10-29 ENCOUNTER — APPOINTMENT (OUTPATIENT)
Dept: GENERAL RADIOLOGY | Age: 82
DRG: 199 | End: 2017-10-29
Payer: MEDICARE

## 2017-10-29 PROCEDURE — 93005 ELECTROCARDIOGRAM TRACING: CPT

## 2017-10-29 PROCEDURE — C9113 INJ PANTOPRAZOLE SODIUM, VIA: HCPCS | Performed by: SURGERY

## 2017-10-29 PROCEDURE — 6360000002 HC RX W HCPCS: Performed by: SURGERY

## 2017-10-29 PROCEDURE — 6370000000 HC RX 637 (ALT 250 FOR IP): Performed by: FAMILY MEDICINE

## 2017-10-29 PROCEDURE — 1200000003 HC TELEMETRY R&B

## 2017-10-29 PROCEDURE — 2580000003 HC RX 258: Performed by: SURGERY

## 2017-10-29 PROCEDURE — 71010 XR CHEST LIMITED ONE VIEW: CPT

## 2017-10-29 PROCEDURE — 6370000000 HC RX 637 (ALT 250 FOR IP): Performed by: SURGERY

## 2017-10-29 PROCEDURE — 99233 SBSQ HOSP IP/OBS HIGH 50: CPT | Performed by: SURGERY

## 2017-10-29 PROCEDURE — 6370000000 HC RX 637 (ALT 250 FOR IP): Performed by: NURSE PRACTITIONER

## 2017-10-29 PROCEDURE — 94640 AIRWAY INHALATION TREATMENT: CPT

## 2017-10-29 RX ADMIN — OXYCODONE HYDROCHLORIDE AND ACETAMINOPHEN 1 TABLET: 5; 325 TABLET ORAL at 03:33

## 2017-10-29 RX ADMIN — IPRATROPIUM BROMIDE AND ALBUTEROL SULFATE 1 AMPULE: .5; 3 SOLUTION RESPIRATORY (INHALATION) at 21:26

## 2017-10-29 RX ADMIN — IPRATROPIUM BROMIDE AND ALBUTEROL SULFATE 1 AMPULE: .5; 3 SOLUTION RESPIRATORY (INHALATION) at 10:36

## 2017-10-29 RX ADMIN — Medication 10 ML: at 20:38

## 2017-10-29 RX ADMIN — SODIUM CHLORIDE: 9 INJECTION, SOLUTION INTRAVENOUS at 13:21

## 2017-10-29 RX ADMIN — BUSPIRONE HYDROCHLORIDE 10 MG: 10 TABLET ORAL at 20:38

## 2017-10-29 RX ADMIN — PANTOPRAZOLE SODIUM 40 MG: 40 INJECTION, POWDER, FOR SOLUTION INTRAVENOUS at 09:09

## 2017-10-29 RX ADMIN — OXYCODONE HYDROCHLORIDE AND ACETAMINOPHEN 1 TABLET: 5; 325 TABLET ORAL at 14:15

## 2017-10-29 RX ADMIN — ENOXAPARIN SODIUM 30 MG: 30 INJECTION SUBCUTANEOUS at 20:41

## 2017-10-29 RX ADMIN — BUSPIRONE HYDROCHLORIDE 10 MG: 10 TABLET ORAL at 09:09

## 2017-10-29 RX ADMIN — IPRATROPIUM BROMIDE AND ALBUTEROL SULFATE 1 AMPULE: .5; 3 SOLUTION RESPIRATORY (INHALATION) at 16:44

## 2017-10-29 RX ADMIN — PRAVASTATIN SODIUM 80 MG: 80 TABLET ORAL at 20:38

## 2017-10-29 RX ADMIN — IPRATROPIUM BROMIDE AND ALBUTEROL SULFATE 1 AMPULE: .5; 3 SOLUTION RESPIRATORY (INHALATION) at 14:47

## 2017-10-29 RX ADMIN — OXYCODONE HYDROCHLORIDE AND ACETAMINOPHEN 1 TABLET: 5; 325 TABLET ORAL at 20:40

## 2017-10-29 RX ADMIN — CLOPIDOGREL 75 MG: 75 TABLET, FILM COATED ORAL at 09:09

## 2017-10-29 RX ADMIN — ASPIRIN 81 MG: 81 TABLET, CHEWABLE ORAL at 09:09

## 2017-10-29 RX ADMIN — OXYCODONE HYDROCHLORIDE AND ACETAMINOPHEN 1 TABLET: 5; 325 TABLET ORAL at 09:10

## 2017-10-29 ASSESSMENT — PAIN SCALES - GENERAL
PAINLEVEL_OUTOF10: 5
PAINLEVEL_OUTOF10: 4

## 2017-10-29 NOTE — PLAN OF CARE
Problem: Falls - Risk of  Goal: Absence of falls  Outcome: Ongoing  Patient up to commode with assistance. Weak in all extremities      Problem: Risk for Impaired Skin Integrity  Goal: Tissue integrity - skin and mucous membranes  Structural intactness and normal physiological function of skin and  mucous membranes. Outcome: Ongoing  Stage 2 noted on coccyx. Patient refuses to turn as recommended. Patient Education provided regarding the benefits of repositioning and the risk to skin integrity associated with not repositioning as recommended. Problem: Pain:  Goal: Pain level will decrease  Pain level will decrease   Outcome: Ongoing  Patient taking oral pain medication now. To help with pain on right side  Goal: Control of acute pain  Control of acute pain   Outcome: Ongoing  Patient able to rest during the night. Pain levels decreased    Problem: OXYGENATION/RESPIRATORY FUNCTION  Goal: Chest tube drainage within specified parameters  Outcome: Ongoing  Drainage has decreased. Goal: Adequate oxygenation  Outcome: Ongoing  Patient from 1L to room air    Problem: Nutrition  Goal: Optimal nutrition therapy  Outcome: Ongoing      Comments: Care plan reviewed with patient and daughter. Patient and daughter verbalize understanding of the plan of care and contribute to goal setting. Patient has confusion at times. Reinforcement is needed.

## 2017-10-29 NOTE — PROGRESS NOTES
General Surgery  Dr Rochelle Portillo  Daily Progress Note      Patient:  Ximena Hernández      Unit/Bed:4K-27/027-A    YOB: 1935    MRN: [id number]     Acct: [de-identified]     Admit date: 10/27/2017    Subjective: patient states  feels better today, less SOB. Denies chest pain. No new concerns  She is alert and oriented, pleasantly confused at times    Patient has not had a pacemaker as noted yesterday    All other ROS negative except noted in HPI      Patient Seen, Chart, Consults notes, Labs, Radiology studies reviewed. Past, Family, Social History unchanged from admission. Diet:  DIET GENERAL;  Dietary Nutrition Supplements: Standard High Calorie Oral Supplement    Medications:  Scheduled Meds:   ipratropium-albuterol  1 ampule Inhalation Q4H WA    ipratropium-albuterol  1 ampule Inhalation Once    enoxaparin  30 mg Subcutaneous Q24H    pneumococcal 13-valent conjugate  0.5 mL Intramuscular Once    aspirin  81 mg Oral Daily    busPIRone  10 mg Oral BID    clopidogrel  75 mg Oral Daily    pravastatin  80 mg Oral Daily    sodium chloride flush  10 mL Intravenous 2 times per day    pantoprazole  40 mg Intravenous Daily    And    sodium chloride (PF)  10 mL Intravenous Daily    nicotine  1 patch Transdermal Daily     Continuous Infusions:   sodium chloride 50 mL/hr at 10/29/17 1321     PRN Meds:meclizine, nitroGLYCERIN, sodium chloride flush, acetaminophen, ondansetron, morphine, oxyCODONE-acetaminophen    Objective:    CBC:   Recent Labs      10/27/17   1330  10/28/17   0529   WBC  8.0  7.8   HGB  13.0  10.8*   PLT  297  220     BMP:    Recent Labs      10/27/17   1330  10/28/17   0529   NA  136  137   K  4.3  3.9   CL  96*  98   CO2  29  28   BUN  14  11   CREATININE  1.0  0.8   GLUCOSE  140*  69*     Calcium:  Recent Labs      10/28/17   0529   CALCIUM  8.8     Ionized Calcium:No results for input(s): IONCA in the last 72 hours.   Magnesium:  Recent Labs      10/27/17   1330   MG  1.8 Radiology: Cta Chest W Wo Contrast    Result Date: 10/27/2017  PROCEDURE: CTA CHEST W WO CONTRAST CLINICAL INFORMATION: DYSPNEA, ON EXERTION,  shortness of breath and wheezing which began yesterday COMPARISON: No prior study. TECHNIQUE: 5 mm axial images were obtained through the chest after the administration of 80 mL Isovue-370 IV contrast.  A non-contrast localizer was obtained. MIP coronal and sagittal reconstructed images of the chest were also obtained. All CT scans at this facility use dose modulation, iterative reconstruction, and/or weight-based dosing when appropriate to reduce radiation dose to as low as reasonably achievable. FINDINGS: There is no evidence of pulmonary embolism. The pulmonary arteries are borderline prominent in size, measuring up to 2.6 cm in diameter. There is no thoracic aortic aneurysm or dissection. The heart is not enlarged. There is no pericardial effusion. There are calcified mediastinal lymph nodes. The tracheobronchial tree is patent. Lung windows reveal a large right pneumothorax, estimated at approximately 50 percent. Extensive background emphysematous changes are noted bilaterally most severe at the upper lobes. There is a thoracic dextroscoliosis. There are degenerative changes throughout the spine. There are prominent Schmorl's nodes. There is a probable bone island to the left of midline at the level of T4.     1. Large right pneumothorax, estimated at approximately 50 percent. 2. No evidence of pulmonary embolism. 3. Severe bilateral emphysematous lung changes. 4. These results were called to Dr. Karin Aguirre at the [facility] emergency department on 10/27/2017 at approximately 1515 hours. **This report has been created using voice recognition software. It may contain minor errors which are inherent in voice recognition technology. ** Final report electronically signed by Dr. Nicko Mccoy on 10/27/2017 3:15 PM    Xr Chest Portable    Result Date: chest tube present with the tip overlying the right upper lung. The heart size is normal. There is hyperinflation noted. There is mild subcutaneous soft tissue emphysema along the right lateral chest wall again    seen. There is S-shaped curvature of the mid and upper thoracic spine. There is a tiny residual right thorax which appears decreased from prior examination. This is less than 5 percent.           Impression   1. Stable portable chest with persistent hyperinflation. 2. Tiny residual pneumothorax at the right apex appears decreased from prior examination. This represents a less than 5 percent pneumothorax.               **This report has been created using voice recognition software.  It may contain minor errors which are inherent in voice recognition technology. **       Final report electronically signed by Dr. Carrington Felty on 10/29/2017 9:18 AM             Physical Exam:  Vitals: /60   Pulse 85   Temp 98.1 °F (36.7 °C) (Oral)   Resp 18   Ht 5' 7\" (1.702 m)   Wt 90 lb 11.2 oz (41.1 kg)   SpO2 92%   BMI 14.21 kg/m²   24 hour intake/output:    Intake/Output Summary (Last 24 hours) at 10/29/17 1412  Last data filed at 10/29/17 1337   Gross per 24 hour   Intake          2905.82 ml   Output              937 ml   Net          1968.82 ml     Last 3 weights:   Wt Readings from Last 3 Encounters:   10/29/17 90 lb 11.2 oz (41.1 kg)       General appearance - oriented to person, place, and time and frail  HEENT: Normocephalic and Atraumatic  Chest - rhonchi noted throughout, diminished breath sounds  Cardiovascular - irregular rhythm   Abdomen - soft, nontender, nondistended, no masses or organomegaly   Neurological - Alert and oriented and Normal speech  Integumentary - Skin color, texture, turgor normal. No Rashes or lesions  Musculoskeletal -Full ROM times 4 extremities, No clubbing or cyanosis and No peripheral edema      DVT prophylaxis: [x] Lovenox                                 [] SCDs [] SQ Heparin                                 [] Encourage ambulation           [] Already on Anticoagulation                 Assessment:  1. Pneumothorax- chest tube to suction  2. SOB improving  3. Fall x 48 h ago  4. Oxygen NC at 2L 100%  Need to ween  5. Chest x ray - improved pneumothorax, small atypical pneumothorax,  6. pacemaker - Questionable  7. Weakness  8. Tobacco dependence  9. AFIB at [facility], resolved       Active Problems:    Primary spontaneous pneumothorax    Severe malnutrition ([address]      Plan:  1. Chest tube to suction  2. General diet  3. Duonebs, IS, ambulate   4. I&O  5. IV hydration  6. Analgesia and antiemetics as needed  7. Telemetry- if AFIB consult cardiology and get EKG  8. Monitor Labs  9. Repeat chest x ray in am   10. Clarified with the family patient does not have a pacemaker  11. Discussed with patient and family will continue chest tube to suction for another 24 hours I personally reviewed the chest x-ray I do not appreciate the small pneumothorax seen by radiology will remove off suction tomorrow morning and continue water seal also family members are concerned patient may not be able to go home by herself at least initially will plan physical therapy once chest tube removed    Electronically signed by Anahy Gunderson MD on 10/29/2017 at 2:12 PM Patient seen and examined independently by me. Above discussed and I agree with CNP. Labs, cultures, and radiographs where available were reviewed. See orders for the updated patient care plan. Anahy Gunderson MD, Personally reviewed the chest x-ray radiology  small pneumothorax difficult to see on computer.   lungs are equal bilaterally no crepitance of the chest wall doing well overall continue chest tube to suction   10/29/2017   2:12 PM

## 2017-10-29 NOTE — PLAN OF CARE
Problem: Impaired respiratory status  Goal: Clear lung sounds  Outcome: Ongoing  Patient is receiving breathing treatments to improve lung sounds to improve lung sounds.

## 2017-10-29 NOTE — PLAN OF CARE
Problem: Falls - Risk of  Goal: Absence of falls  Outcome: Met This Shift  Free of falls this shift  Up with 2 assist  Gait unsteady     Problem: Risk for Impaired Skin Integrity  Goal: Tissue integrity - skin and mucous membranes  Structural intactness and normal physiological function of skin and  mucous membranes. Outcome: Ongoing  Stage 2 to the buttocks  All other skin intact      Problem: Pain:  Goal: Pain level will decrease  Pain level will decrease   Outcome: Met This Shift  Pain with movement due to chest tube  Pt states pain improved this shift with pain medication   Goal: Control of acute pain  Control of acute pain   Outcome: Met This Shift  Pain with movement from chest tube  Improved from previous shift     Problem: OXYGENATION/RESPIRATORY FUNCTION  Goal: Chest tube drainage within specified parameters  Outcome: Met This Shift  Patient had 10ml of bloody fluid from chest tube on last I & O   Goal: Adequate oxygenation  Outcome: Met This Shift  On room air 02 above 93%     Problem: Nutrition  Goal: Optimal nutrition therapy  Outcome: Met This Shift  Patient consumes 50% of meals     Comments: Care plan reviewed with patient. Patient verbalizes understanding of the plan of care and contributes to goal setting.

## 2017-10-30 ENCOUNTER — APPOINTMENT (OUTPATIENT)
Dept: GENERAL RADIOLOGY | Age: 82
DRG: 199 | End: 2017-10-30
Payer: MEDICARE

## 2017-10-30 LAB
EKG ATRIAL RATE: 82 BPM
EKG ATRIAL RATE: 90 BPM
EKG P AXIS: 105 DEGREES
EKG P-R INTERVAL: 114 MS
EKG Q-T INTERVAL: 354 MS
EKG Q-T INTERVAL: 354 MS
EKG QRS DURATION: 62 MS
EKG QRS DURATION: 82 MS
EKG QTC CALCULATION (BAZETT): 425 MS
EKG QTC CALCULATION (BAZETT): 433 MS
EKG R AXIS: 78 DEGREES
EKG R AXIS: 83 DEGREES
EKG T AXIS: 58 DEGREES
EKG T AXIS: 76 DEGREES
EKG VENTRICULAR RATE: 87 BPM
EKG VENTRICULAR RATE: 90 BPM

## 2017-10-30 PROCEDURE — 99233 SBSQ HOSP IP/OBS HIGH 50: CPT | Performed by: SURGERY

## 2017-10-30 PROCEDURE — 99999 PR OFFICE/OUTPT VISIT,PROCEDURE ONLY: CPT | Performed by: NURSE PRACTITIONER

## 2017-10-30 PROCEDURE — 93005 ELECTROCARDIOGRAM TRACING: CPT

## 2017-10-30 PROCEDURE — 2580000003 HC RX 258: Performed by: SURGERY

## 2017-10-30 PROCEDURE — G8978 MOBILITY CURRENT STATUS: HCPCS

## 2017-10-30 PROCEDURE — 97163 PT EVAL HIGH COMPLEX 45 MIN: CPT

## 2017-10-30 PROCEDURE — G8979 MOBILITY GOAL STATUS: HCPCS

## 2017-10-30 PROCEDURE — 97530 THERAPEUTIC ACTIVITIES: CPT

## 2017-10-30 PROCEDURE — C9113 INJ PANTOPRAZOLE SODIUM, VIA: HCPCS | Performed by: SURGERY

## 2017-10-30 PROCEDURE — 6370000000 HC RX 637 (ALT 250 FOR IP): Performed by: SURGERY

## 2017-10-30 PROCEDURE — 71010 XR CHEST LIMITED ONE VIEW: CPT

## 2017-10-30 PROCEDURE — 1200000003 HC TELEMETRY R&B

## 2017-10-30 PROCEDURE — A4421 OSTOMY SUPPLY MISC: HCPCS

## 2017-10-30 PROCEDURE — 94640 AIRWAY INHALATION TREATMENT: CPT

## 2017-10-30 PROCEDURE — 6360000002 HC RX W HCPCS: Performed by: SURGERY

## 2017-10-30 PROCEDURE — 6370000000 HC RX 637 (ALT 250 FOR IP): Performed by: NURSE PRACTITIONER

## 2017-10-30 RX ORDER — EZETIMIBE 10 MG/1
10 TABLET ORAL DAILY
COMMUNITY

## 2017-10-30 RX ORDER — ALBUTEROL SULFATE 90 UG/1
2 AEROSOL, METERED RESPIRATORY (INHALATION) EVERY 4 HOURS PRN
COMMUNITY

## 2017-10-30 RX ADMIN — IPRATROPIUM BROMIDE AND ALBUTEROL SULFATE 1 AMPULE: .5; 3 SOLUTION RESPIRATORY (INHALATION) at 16:35

## 2017-10-30 RX ADMIN — Medication 10 ML: at 07:25

## 2017-10-30 RX ADMIN — BUSPIRONE HYDROCHLORIDE 10 MG: 10 TABLET ORAL at 08:47

## 2017-10-30 RX ADMIN — ACETAMINOPHEN 650 MG: 325 TABLET ORAL at 20:06

## 2017-10-30 RX ADMIN — IPRATROPIUM BROMIDE AND ALBUTEROL SULFATE 1 AMPULE: .5; 3 SOLUTION RESPIRATORY (INHALATION) at 21:03

## 2017-10-30 RX ADMIN — CLOPIDOGREL 75 MG: 75 TABLET, FILM COATED ORAL at 08:47

## 2017-10-30 RX ADMIN — ASPIRIN 81 MG: 81 TABLET, CHEWABLE ORAL at 09:38

## 2017-10-30 RX ADMIN — IPRATROPIUM BROMIDE AND ALBUTEROL SULFATE 1 AMPULE: .5; 3 SOLUTION RESPIRATORY (INHALATION) at 09:14

## 2017-10-30 RX ADMIN — ENOXAPARIN SODIUM 30 MG: 30 INJECTION SUBCUTANEOUS at 20:04

## 2017-10-30 RX ADMIN — PRAVASTATIN SODIUM 80 MG: 80 TABLET ORAL at 20:04

## 2017-10-30 RX ADMIN — IPRATROPIUM BROMIDE AND ALBUTEROL SULFATE 1 AMPULE: .5; 3 SOLUTION RESPIRATORY (INHALATION) at 12:37

## 2017-10-30 RX ADMIN — BUSPIRONE HYDROCHLORIDE 10 MG: 10 TABLET ORAL at 20:04

## 2017-10-30 RX ADMIN — PANTOPRAZOLE SODIUM 40 MG: 40 INJECTION, POWDER, FOR SOLUTION INTRAVENOUS at 07:25

## 2017-10-30 ASSESSMENT — PAIN SCALES - GENERAL
PAINLEVEL_OUTOF10: 3
PAINLEVEL_OUTOF10: 4

## 2017-10-30 NOTE — PROGRESS NOTES
General Surgery  Dr Chang [facility]  Daily Progress Note      Patient:  Debra Doherty      Unit/Bed:4K-27/027-A    YOB: 1935    MRN: [id number]     Acct: [de-identified]     Admit date: 10/27/2017    Subjective: patient states  feels better today, less SOB. Denies chest pain. No new concerns. She is alert and oriented, pleasantly confused at times, Family concerned with patient returning home after discharge. May need to consider TCU or ECF, pending PT an OT evaluation. Chart havs been reviewed and updated    Patient has not had a pacemaker     All other ROS negative except noted in HPI      Patient Seen, Chart, Consults notes, Labs, Radiology studies reviewed. Past, Family, Social History unchanged from admission.     Diet:  DIET GENERAL;  Dietary Nutrition Supplements: Standard High Calorie Oral Supplement    Medications:  Scheduled Meds:   ipratropium-albuterol  1 ampule Inhalation Q4H WA    enoxaparin  30 mg Subcutaneous Q24H    pneumococcal 13-valent conjugate  0.5 mL Intramuscular Once    aspirin  81 mg Oral Daily    busPIRone  10 mg Oral BID    clopidogrel  75 mg Oral Daily    pravastatin  80 mg Oral Daily    sodium chloride flush  10 mL Intravenous 2 times per day    pantoprazole  40 mg Intravenous Daily    And    sodium chloride (PF)  10 mL Intravenous Daily    nicotine  1 patch Transdermal Daily     Continuous Infusions:   sodium chloride 50 mL/hr at 10/29/17 1321     PRN Meds:meclizine, nitroGLYCERIN, sodium chloride flush, acetaminophen, ondansetron, morphine, oxyCODONE-acetaminophen    Objective:    CBC:   Recent Labs      10/27/17   1330  10/28/17   0529   WBC  8.0  7.8   HGB  13.0  10.8*   PLT  297  220     BMP:    Recent Labs      10/27/17   1330  10/28/17   0529   NA  136  137   K  4.3  3.9   CL  96*  98   CO2  29  28   BUN  14  11   CREATININE  1.0  0.8   GLUCOSE  140*  69*     Calcium:  Recent Labs      10/28/17   0529   CALCIUM  8.8     Ionized Calcium:No results for     Final report electronically signed by Dr. Obed Stafford on 10/28/2017 9:27 AM       Radiology: [facility] Chest W Wo Contrast    Result Date: 10/27/2017  PROCEDURE: CTA CHEST W WO CONTRAST CLINICAL INFORMATION: DYSPNEA, ON EXERTION,  shortness of breath and wheezing which began yesterday COMPARISON: No prior study. TECHNIQUE: 5 mm axial images were obtained through the chest after the administration of 80 mL Isovue-370 IV contrast.  A non-contrast localizer was obtained. MIP coronal and sagittal reconstructed images of the chest were also obtained. All CT scans at this facility use dose modulation, iterative reconstruction, and/or weight-based dosing when appropriate to reduce radiation dose to as low as reasonably achievable. FINDINGS: There is no evidence of pulmonary embolism. The pulmonary arteries are borderline prominent in size, measuring up to 2.6 cm in diameter. There is no thoracic aortic aneurysm or dissection. The heart is not enlarged. There is no pericardial effusion. There are calcified mediastinal lymph nodes. The tracheobronchial tree is patent. Lung windows reveal a large right pneumothorax, estimated at approximately 50 percent. Extensive background emphysematous changes are noted bilaterally most severe at the upper lobes. There is a thoracic dextroscoliosis. There are degenerative changes throughout the spine. There are prominent Schmorl's nodes. There is a probable bone island to the left of midline at the level of T4.     1. Large right pneumothorax, estimated at approximately 50 percent. 2. No evidence of pulmonary embolism. 3. Severe bilateral emphysematous lung changes. 4. These results were called to Dr. Marbin Lee at the [facility] emergency department on 10/27/2017 at approximately 1515 hours. **This report has been created using voice recognition software. It may contain minor errors which are inherent in voice recognition technology. ** Final report electronically signed by Dr. Jhonatan Rosas  AP mobile chest         FINDINGS: There is a large-bore right-sided chest tube present with the tip overlying the right upper lung. The heart size is normal. There is hyperinflation noted. There is mild subcutaneous soft tissue emphysema along the right lateral chest wall again    seen. There is S-shaped curvature of the mid and upper thoracic spine. There is a tiny residual right thorax which appears decreased from prior examination. This is less than 5 percent.           Impression   1. Stable portable chest with persistent hyperinflation. 2. Tiny residual pneumothorax at the right apex appears decreased from prior examination. This represents a less than 5 percent pneumothorax.               **This report has been created using voice recognition software.  It may contain minor errors which are inherent in voice recognition technology. **       Final report electronically signed by Dr. Darya Baltazar on 10/29/2017 9:18 AM             Physical Exam:  Vitals: BP (!) 167/55   Pulse 82   Temp 98.2 °F (36.8 °C) (Oral)   Resp 16   Ht 5' 7\" (1.702 m)   Wt 90 lb 11.2 oz (41.1 kg)   SpO2 96%   BMI 14.21 kg/m²   24 hour intake/output:    Intake/Output Summary (Last 24 hours) at 10/30/17 1123  Last data filed at 10/30/17 0725   Gross per 24 hour   Intake          1608.28 ml   Output              961 ml   Net           647.28 ml     Last 3 weights:   Wt Readings from Last 3 Encounters:   10/29/17 90 lb 11.2 oz (41.1 kg)       General appearance - oriented to person, place, and time and frail  HEENT: Normocephalic and Atraumatic  Chest - rhonchi noted throughout, diminished breath sounds  Cardiovascular - irregular rhythm, chest tube in place to water seal   Abdomen - soft, nontender, nondistended, no masses or organomegaly   Neurological - Alert and oriented and Normal speech  Integumentary - Skin color, texture, turgor normal. No Rashes or lesions  Musculoskeletal -Full ROM times 4 extremities, No clubbing or cyanosis

## 2017-10-30 NOTE — PLAN OF CARE
Problem: Falls - Risk of  Goal: Absence of falls  Outcome: Ongoing  Pt. Alert and oriented. Pt. Use call light appropriately. no falls this shift     Problem: Risk for Impaired Skin Integrity  Goal: Tissue integrity - skin and mucous membranes  Structural intactness and normal physiological function of skin and  mucous membranes. Outcome: Ongoing  See LDA    Problem: Pain:  Goal: Control of acute pain  Control of acute pain   Outcome: Ongoing  Pain in the right side. Controlled with oral meds    Problem: OXYGENATION/RESPIRATORY FUNCTION  Goal: Chest tube drainage within specified parameters  Outcome: Ongoing  Chest tube drainage 9 ml out this shift  Goal: Adequate oxygenation  Outcome: Ongoing  Patient on room air    Comments: Care plan reviewed with patient. Patient  verbalize understanding of the plan of care and contribute to goal setting.

## 2017-10-30 NOTE — PLAN OF CARE
Problem: Impaired respiratory status  Goal: Clear lung sounds  Outcome: Ongoing  Cont aerosol to improve aeration

## 2017-10-30 NOTE — PLAN OF CARE
Problem: DISCHARGE BARRIERS  Goal: Patient's continuum of care needs are met  Outcome: Ongoing  See SW note, planning ECF

## 2017-10-30 NOTE — PROGRESS NOTES
Assistance: Independent  Transfer Assistance: Independent    Active : Yes (only to and from mailbox to get mail)  Mode of Transportation: Car  Additional Comments: I with mobility with no AD PTA, spends majority of day alone. Family gets groceries    Objective:       RLE AROM: WFL         LLE AROM : WFL            Strength RLE: Exception  Comment: `    Strength LLE: Exception  Comment: gross 4/5         Sensation  Overall Sensation Status: WFL       Balance  Sitting - Static: Fair, +  Sitting - Dynamic: Fair, +  Standing - Static: Fair, +  Standing - Dynamic: Fair    Supine to Sit: Contact guard assistance (HOB elevated ~45 deg, use of rail)  Sit to Supine: Contact guard assistance (HOB elevated ~45 deg, use of rail)    Transfers  Sit to Stand: Contact guard assistance (from EOB and commode. verbal cues for hand placement)  Stand to sit: Contact guard assistance (to commode and EOB. good eccentric lowering)       Ambulation 1  Surface: level tile  Device: Rolling Walker  Assistance: Contact guard assistance  Quality of Gait: decreased velocity and beatrice, decreased but equal step length B, slight forward flexed trunk, fair heel strike B  Distance: 10ft       Exercises:  Comments: seated ankle pumps, long arc quads, hip ABD, and marches x10 reps each B with verbal and visual cues for performance to increase strength and improve mobility. patient fatigued following performance          Activity Tolerance:  Activity Tolerance: Patient limited by fatigue;Patient limited by endurance    Treatment Initiated: see exercises. Patient ambulated additional 25ft with RW see ambulation for details. Verbal cues provided to increase step height and heel strike B with fair performance by patient with slight improve in stability noted    Assessment:   Body structures, Functions, Activity limitations: Decreased functional mobility , Decreased ADL status, Decreased strength, Decreased endurance, Decreased balance  Assessment: patient tolerated evaluation fairly well this date, requires brief seated rest breaks between exercises secondary to SOB. Will benefit from skilled PT to improve above factors and level of tolerance to functional activity to increase independence and return to prior level of function  Prognosis: Good    Clinical Presentation: High - Unstable with Unpredictable Characteristics: chest tube, decreased strength, decreased endurance, decreased balance, use of AD, I with no AD PTA:    Decision Making: High Complexitybased on patient assessment and decision making process of determining plan of care and establishing reasonable expectations for measurable functional outcomes    REQUIRES PT FOLLOW UP: Yes  Discharge Recommendations: Continue to assess pending progress    Patient Education:  Patient Education: POC, transfers, gait, performance of exercises, SNF    Equipment Recommendations:  Equipment Needed: No  Other: continue to assess - has RW    Safety:  Type of devices:  All fall risk precautions in place, Call light within reach, Gait belt, Patient at risk for falls, Left in bed, Nurse notified    Plan:  Times per week: 5x GM  Specific instructions for Next Treatment: strengthening, standing balance, gait training, endurance, steps when appropriate  Current Treatment Recommendations: Strengthening, Balance Training, Functional Mobility Training, Transfer Training, ADL/Self-care Training, Endurance Training, Gait Training, Stair training, Home Exercise Program, Safety Education & Training, Patient/Caregiver Education & Training, Equipment Evaluation, Education, & procurement, Neuromuscular Re-education    Goals:  Patient goals : move better, go home  Short term goals  Time Frame for Short term goals: 1 week  Short term goal 1: patient to perform bed mobility at S to get in and out of bed  Short term goal 2: patient to perform transfers from various surfaces at S to rise from bed or chair  Short term goal 3: patient to ambulate 100ft with least restrictive device at S for household mobility  Short term goal 4: patient to negotiate 4 steps with B rails at S for home access  Long term goals  Time Frame for Long term goals : defer d/t short ELOS    Evaluation Complexity: Based on the findings of patient history, examination, clinical presentation, and decision making during this evaluation, the evaluation of Manuel Mcbride  is of high complexity. PT G-Codes  Functional Limitation: Mobility: Walking and moving around  Mobility: Walking and Moving Around Current Status (): At least 40 percent but less than 60 percent impaired, limited or restricted  Mobility: Walking and Moving Around Goal Status ():  At least 20 percent but less than 40 percent impaired, limited or restricted    AM-PAC Inpatient Mobility without Stair Climbing Raw Score : 15  AM-PAC Inpatient without Stair Climbing T-Scale Score : 43.03  Mobility Inpatient CMS 0-100% Score: 47.43  Mobility Inpatient without Stair CMS G-Code Modifier : CK

## 2017-10-31 ENCOUNTER — APPOINTMENT (OUTPATIENT)
Dept: GENERAL RADIOLOGY | Age: 82
DRG: 199 | End: 2017-10-31
Payer: MEDICARE

## 2017-10-31 PROCEDURE — 6370000000 HC RX 637 (ALT 250 FOR IP): Performed by: SURGERY

## 2017-10-31 PROCEDURE — 1200000003 HC TELEMETRY R&B

## 2017-10-31 PROCEDURE — 99999 PR OFFICE/OUTPT VISIT,PROCEDURE ONLY: CPT | Performed by: NURSE PRACTITIONER

## 2017-10-31 PROCEDURE — 94640 AIRWAY INHALATION TREATMENT: CPT

## 2017-10-31 PROCEDURE — G8988 SELF CARE GOAL STATUS: HCPCS

## 2017-10-31 PROCEDURE — 6360000002 HC RX W HCPCS: Performed by: SURGERY

## 2017-10-31 PROCEDURE — 97116 GAIT TRAINING THERAPY: CPT

## 2017-10-31 PROCEDURE — C9113 INJ PANTOPRAZOLE SODIUM, VIA: HCPCS | Performed by: SURGERY

## 2017-10-31 PROCEDURE — 99233 SBSQ HOSP IP/OBS HIGH 50: CPT | Performed by: SURGERY

## 2017-10-31 PROCEDURE — 71010 XR CHEST PORTABLE: CPT

## 2017-10-31 PROCEDURE — 97166 OT EVAL MOD COMPLEX 45 MIN: CPT

## 2017-10-31 PROCEDURE — 2580000003 HC RX 258: Performed by: SURGERY

## 2017-10-31 PROCEDURE — 6370000000 HC RX 637 (ALT 250 FOR IP): Performed by: NURSE PRACTITIONER

## 2017-10-31 PROCEDURE — A6223 GAUZE >16<=48 NO W/SAL W/O B: HCPCS

## 2017-10-31 PROCEDURE — 97110 THERAPEUTIC EXERCISES: CPT

## 2017-10-31 PROCEDURE — G8987 SELF CARE CURRENT STATUS: HCPCS

## 2017-10-31 RX ORDER — PANTOPRAZOLE SODIUM 40 MG/1
40 TABLET, DELAYED RELEASE ORAL
Status: DISCONTINUED | OUTPATIENT
Start: 2017-11-01 | End: 2017-11-01 | Stop reason: HOSPADM

## 2017-10-31 RX ADMIN — IPRATROPIUM BROMIDE AND ALBUTEROL SULFATE 1 AMPULE: .5; 3 SOLUTION RESPIRATORY (INHALATION) at 22:24

## 2017-10-31 RX ADMIN — CLOPIDOGREL 75 MG: 75 TABLET, FILM COATED ORAL at 09:02

## 2017-10-31 RX ADMIN — IPRATROPIUM BROMIDE AND ALBUTEROL SULFATE 1 AMPULE: .5; 3 SOLUTION RESPIRATORY (INHALATION) at 17:31

## 2017-10-31 RX ADMIN — Medication 10 ML: at 06:24

## 2017-10-31 RX ADMIN — IPRATROPIUM BROMIDE AND ALBUTEROL SULFATE 1 AMPULE: .5; 3 SOLUTION RESPIRATORY (INHALATION) at 13:47

## 2017-10-31 RX ADMIN — ENOXAPARIN SODIUM 30 MG: 30 INJECTION SUBCUTANEOUS at 21:10

## 2017-10-31 RX ADMIN — IPRATROPIUM BROMIDE AND ALBUTEROL SULFATE 1 AMPULE: .5; 3 SOLUTION RESPIRATORY (INHALATION) at 10:20

## 2017-10-31 RX ADMIN — PRAVASTATIN SODIUM 80 MG: 80 TABLET ORAL at 21:10

## 2017-10-31 RX ADMIN — BUSPIRONE HYDROCHLORIDE 10 MG: 10 TABLET ORAL at 21:10

## 2017-10-31 RX ADMIN — BUSPIRONE HYDROCHLORIDE 10 MG: 10 TABLET ORAL at 09:02

## 2017-10-31 RX ADMIN — PANTOPRAZOLE SODIUM 40 MG: 40 INJECTION, POWDER, FOR SOLUTION INTRAVENOUS at 06:26

## 2017-10-31 RX ADMIN — ASPIRIN 81 MG: 81 TABLET, CHEWABLE ORAL at 09:02

## 2017-10-31 RX ADMIN — SODIUM CHLORIDE: 9 INJECTION, SOLUTION INTRAVENOUS at 06:43

## 2017-10-31 ASSESSMENT — PAIN SCALES - GENERAL
PAINLEVEL_OUTOF10: 0
PAINLEVEL_OUTOF10: 2

## 2017-10-31 NOTE — CARE COORDINATION
10/31/17, 10:13 AM    DISCHARGE BARRIERS      ECF aware of discharge likely tomorrow and likely no Lovenox at discharge

## 2017-10-31 NOTE — PLAN OF CARE
Problem: Falls - Risk of  Goal: Absence of falls  Outcome: Ongoing  No falls this shift. Patient on fall precautions. Patient pivots from bed to commode and back this shift. Patient wears nonskid socks when ambulating. Armband and falling star visible. Siderails up 2/4. Patient has call light and uses it appropriately. Hourly rounding performed. Problem: Risk for Impaired Skin Integrity  Goal: Tissue integrity - skin and mucous membranes  Structural intactness and normal physiological function of skin and  mucous membranes. Outcome: Ongoing  Patient has many bony prominence risks for skin breakdown. Patient turned and repositioned every two hours and as needed to prevent skin breakdown. Stage II to buttocks. Barrier cream applied. Problem: Pain:  Goal: Pain level will decrease  Pain level will decrease   Outcome: Ongoing  Patient reported slight discomfort and took Tylenol as ordered for it. Patient reports effective. Goal: Control of acute pain  Control of acute pain   Patient reports pain as discomfort at chest tube site. Controlled with Tylenol. Goal: Control of chronic pain  Control of chronic pain   Outcome: Ongoing      Problem: OXYGENATION/RESPIRATORY FUNCTION  Goal: Chest tube drainage within specified parameters  Outcome: Ongoing  Chest tube to right check on water seal.   Goal: Adequate oxygenation  Outcome: Ongoing  Patients SPO2 remains mid 90's on RA. Problem: Nutrition  Goal: Optimal nutrition therapy  Outcome: Ongoing  Patient on general diet and tolerating it well. Family states patient is taking Ensures at home. Will see if we will be resuming here. Problem: DISCHARGE BARRIERS  Goal: Patient's continuum of care needs are met  Outcome: Ongoing  Patient is hoping for discharge to [facility] at discharge. Originally patient from home alone. Comments: Care plan reviewed with patient. Patient verbalizes understanding of the plan of care and contribute to goal setting.

## 2017-10-31 NOTE — PROGRESS NOTES
Safety Education & Training, Patient/Caregiver Education & Training, Equipment Evaluation, Education, & procurement, Neuromuscular Re-education    Goals:  Patient goals : move better, go home    Short term goals  Time Frame for Short term goals: 1 week  Short term goal 1: patient to perform bed mobility at S to get in and out of bed  Short term goal 2: patient to perform transfers from various surfaces at S to rise from bed or chair  Short term goal 3: patient to ambulate 100ft with least restrictive device at S for household mobility  Short term goal 4: patient to negotiate 4 steps with B rails at S for home access    Long term goals  Time Frame for Long term goals : defer d/t short ELOS    PT G-Codes  Functional Limitation: Mobility: Walking and moving around  Mobility: Walking and Moving Around Current Status (): At least 40 percent but less than 60 percent impaired, limited or restricted  Mobility: Walking and Moving Around Goal Status ():  At least 20 percent but less than 40 percent impaired, limited or restricted    AM-PAC Inpatient Mobility without Stair Climbing Raw Score : 15  AM-PAC Inpatient without Stair Climbing T-Scale Score : 43.03  Mobility Inpatient CMS 0-100% Score: 47.43  Mobility Inpatient without Stair CMS G-Code Modifier : CK

## 2017-10-31 NOTE — PLAN OF CARE
Problem: Impaired respiratory status  Goal: Clear lung sounds  Outcome: Ongoing  Keep lungs clear from wheezes

## 2017-10-31 NOTE — FLOWSHEET NOTE
Patient was sitting up in her chair having her lunch. Her daughter was in the room with her. She will be leaving tomorrow for rehab at a nursing home. Patient talked about her family and she is Amish but does not go to [facility] at this time. Patient has a living will and family will try to get a copy to [address] [place] [address] for our records. 10/31/17 1320   Encounter Summary   Services provided to: Patient and family together   Referral/Consult From: [address] [facility]   Contact Hindu No   Continue Visiting Yes  (10/31 leaving tomorrow)   Complexity of Encounter Moderate   Length of Encounter 15 minutes   Routine   Type Initial   Assessment Approachable   Intervention Nurtured hope;Sustaining presence/ Ministry of presence   Outcome Coping;Engaged in conversation   Spiritual/Mu-ism   Type Spiritual support   words of encouragement given.

## 2017-10-31 NOTE — PROGRESS NOTES
[facility] 62  INPATIENT OCCUPATIONAL THERAPY  [facility] ICU STEPDOWN TELEMETRY 4K  EVALUATION    Time:  Time In: 1511  Time Out: 1445  Timed Code Treatment Minutes: 0 Minutes  Minutes: 13          Date: 10/31/2017  Patient Name: Desiree Brooks,   Gender: female      MRN: [id number]  : 1935  ([de-identified] y.o.)  Referring Practitioner: Magno Pena CNP  Diagnosis: Pneumothorax  Additional Pertinent Hx: Desiree Brooks is a [de-identified] y.o. female who presents chief complaint of shortness of breath, weakness, fever, chills, subjective, productive cough ongoing for about 2 days. Of note patient does continue to smoke.  Chest tube placed on R     Restrictions/Precautions:  Restrictions/Precautions: General Precautions, Fall Risk            Position Activity Restriction  Other position/activity restrictions: R chest tube         Past Medical History:   Diagnosis Date    CAD (coronary artery disease)     Thyroid disease      Past Surgical History:   Procedure Laterality Date    CORONARY ANGIOPLASTY WITH STENT PLACEMENT      Stent to Mid RCA, 03, with Dr. Warden Sim: Yes (H&P, RN notes, imaging, orders)  Patient assessed for rehabilitation services?: Yes  Family / Caregiver Present: Yes    Subjective: Pleasant and cooperative, finishing lunch, but agreeable to short session    General:  Overall Orientation Status: Within Functional Limits    Vision: Impaired    Hearing: Within functional limits         Pain:  Pain Assessment  Patient Currently in Pain: Denies       Social/Functional:  Lives With: Alone  Type of Home: House  Home Layout: One level  Home Access: Stairs to enter with rails  Entrance Stairs - Number of Steps: 4  Entrance Stairs - Rails: Both  Home Equipment: Rolling walker     Bathroom Shower/Tub: Walk-in shower (small threshhold)  Bathroom Toilet: Standard  Bathroom Equipment: Shower chair, Grab bars in shower       ADL Assistance: [address] [place] [address] Avenue: Independent  Homemaking Responsibilities: Yes    Ambulation Assistance: Independent  Transfer Assistance: Independent    Active : Yes  Mode of Transportation: Car  Additional Comments: I with mobility with no AD PTA, spends majority of day alone. Family gets groceries, nephew does outdoor work, has 6 children who assist as needed. Objective        Overall Cognitive Status: WFL         Sensation  Overall Sensation Status: WFL                           LUE AROM (degrees)  LUE AROM : WFL          RUE AROM (degrees)  RUE AROM : WFL       LUE Strength  LUE Strength Comment: 4-/5                RUE Strength  RUE Strength Comment: 4-/5           Transfers  Sit to stand: Contact guard assistance  Stand to sit: Contact guard assistance  Transfer Comments: recliner    Balance  Sitting Balance: Supervision  Standing Balance: Contact guard assistance     Time: x 30 sec in prep for mobility     Functional Mobility  Functional - Mobility Device: Rolling Walker  Activity: Other  Assist Level: Contact guard assistance  Functional Mobility Comments: within room, min unsteadiness, pt declined further mobility          Activity Tolerance:  Activity Tolerance: Patient Tolerated treatment well      Assessment:  Assessment: Pt exhibiting above deficits requiring additional OT interevention to increase indep with self care. Performance deficits / Impairments: Decreased functional mobility , Decreased endurance, Decreased ADL status, Decreased strength, Decreased balance  Prognosis: Good  Discharge Recommendations: Continue to assess pending progress    Clinical Decision Making: Clinical Decision making was of Moderate Complexity as the result of analysis of data from a detailed assessment, a consideration of several treatment options, the presence of comorbidities affecting the plan of care and the need for minimal to moderate modifications or assistance required to complete the evaluation.     Patient Education:  Patient Education: OT POC, safety with mobility, continuum of care    Equipment Recommendations:  Equipment Needed: No    Safety:  Safety Devices in place: Yes  Type of devices: Gait belt, Patient at risk for falls, Call light within reach, Left in chair    Plan:  Times per week: 3-5x  Current Treatment Recommendations: Strengthening, Balance Training, Functional Mobility Training, Safety Education & Training, Patient/Caregiver Education & Training, Self-Care / ADL    Goals:       Short term goals  Time Frame for Short term goals: 1 week  Short term goal 1: Pt will complete functional mobility within environment with AD and SBA to increase indep with accessing environment. Short term goal 2: Pt will tolerate dynamic standing task x 5 min with B hand release and SBA to increase balance required during grooming. Short term goal 3: Pt will complete ADL routine with SBA to increase indep with self care. Long term goals  Time Frame for Long term goals : No LTG d/t short ELOS. Evaluation Complexity: Based on the findings of patient history, examination, clinical presentation, and decision making during this evaluation, this patient is of medium complexity. OT G-codes  Functional Limitation: Self care  Self Care Current Status (): At least 40 percent but less than 60 percent impaired, limited or restricted  Self Care Goal Status ():  At least 1 percent but less than 20 percent impaired, limited or restricted    AM-PAC Inpatient Daily Activity Raw Score: 19  AM-PAC Inpatient ADL T-Scale Score : 40.22  ADL Inpatient CMS 0-100% Score: 42.8  ADL Inpatient CMS G-Code Modifier : LEONARDA

## 2017-10-31 NOTE — PROGRESS NOTES
General Surgery  Dr Marco Godinez  Daily Progress Note      Patient:  Jaz Schneider      Unit/Bed:4K-27/027-A    YOB: 1935    MRN: [id number]     Acct: [de-identified]     Admit date: 10/27/2017    Subjective: patient states  feels better today, less SOB. Denies chest pain. No new concerns. She is alert and oriented, pleasantly confused at times, Family concerned with patient returning home after discharge. [facility]CF at discharge for rehab to home. Chart have been reviewed and updated by nursing staff. Repeat chest x ray today, possible remove chest tube     Patient has not had a pacemaker     All other ROS negative except noted in HPI      Patient Seen, Chart, Consults notes, Labs, Radiology studies reviewed. Past, Family, Social History unchanged from admission. Diet:  DIET GENERAL;  Dietary Nutrition Supplements: Standard High Calorie Oral Supplement    Medications:  Scheduled Meds:   ipratropium-albuterol  1 ampule Inhalation Q4H WA    enoxaparin  30 mg Subcutaneous Q24H    pneumococcal 13-valent conjugate  0.5 mL Intramuscular Once    aspirin  81 mg Oral Daily    busPIRone  10 mg Oral BID    clopidogrel  75 mg Oral Daily    pravastatin  80 mg Oral Daily    sodium chloride flush  10 mL Intravenous 2 times per day    pantoprazole  40 mg Intravenous Daily    And    sodium chloride (PF)  10 mL Intravenous Daily    nicotine  1 patch Transdermal Daily     Continuous Infusions:   sodium chloride 50 mL/hr at 10/31/17 0643     PRN Meds:meclizine, nitroGLYCERIN, sodium chloride flush, acetaminophen, ondansetron, morphine, oxyCODONE-acetaminophen    Objective:    CBC:   No results for input(s): WBC, HGB, PLT in the last 72 hours. BMP:    No results for input(s): NA, K, CL, CO2, BUN, CREATININE, GLUCOSE in the last 72 hours. Calcium:  No results for input(s): CALCIUM in the last 72 hours. Ionized Calcium:No results for input(s): IONCA in the last 72 hours.   Magnesium:  No results for input(s): MG in the last 72 hours. Phosphorus:No results for input(s): PHOS in the last 72 hours. BNP:No results for input(s): BNP in the last 72 hours. Glucose:No results for input(s): POCGLU in the last 72 hours. HgbA1C: No results for input(s): LABA1C in the last 72 hours. INR: No results for input(s): INR in the last 72 hours. Hepatic:   No results for input(s): ALKPHOS, ALT, AST, PROT, BILITOT, BILIDIR, LABALBU in the last 72 hours. Amylase and Lipase:No results for input(s): LACTA, AMYLASE in the last 72 hours. Lactic Acid: No results for input(s): LACTA in the last 72 hours. Troponin: No results for input(s): CKTOTAL, CKMB, TROPONINT in the last 72 hours. BNP: No results for input(s): BNP in the last 72 hours. Lipids: No results for input(s): CHOL, TRIG, HDL, LDLCALC in the last 72 hours. Invalid input(s): LDL  ABGs: No results found for: PH, PCO2, PO2, HCO3, O2SAT    Radiology reports as per the Radiologist    Chest X Ray  10/30/17  Narrative   PROCEDURE: XR CHEST LIMITED ONE VIEW       CLINICAL INFORMATION: f/u pneumothorax, changed from suction to waterseal this am,, .  Chest tube in place since 10/27/2017. Some shortness of breath.       COMPARISON: Chest x-ray 10/29/2017.       TECHNIQUE: AP upright view of the chest.       FINDINGS:   There is a large bore right-sided chest tube. There is a small right apical pneumothorax. There is a small right-sided pleural effusion.       The heart size is normal. The mediastinum is not widened.  There are back on emphysematous changes. There are no pulmonary infiltrates. The pulmonary vascularity is normal.   There are degenerative changes in both shoulders.           Impression   1. Small right apical pneumothorax. This is approximately 5 %. There is a right-sided chest tube in place. 2. Background emphysema.          Chest X Ray 1VW   10/28/17  PROCEDURE: XR CHEST LIMITED ONE VIEW       CLINICAL INFORMATION: R Pneumo,         COMPARISON: 10/27/2017     TECHNIQUE:  AP mobile chest         FINDINGS: The cardiomediastinal silhouette appears within normal limits. There is a large-bore right-sided chest tube present with the tip overlying the right upper lung zone. There is possible tiny right apical pneumothorax. Emphysema along the right    lateral chest wall. No pneumothorax is seen. There is hyperinflation noted. There is a calcified granuloma overlying the left lung base laterally.           Impression   1. Possible tiny right apical pneumothorax. Otherwise stable portable chest with persistent hyperinflation. Large bore right-sided chest tube appears unchanged.               **This report has been created using voice recognition software.  It may contain minor errors which are inherent in voice recognition technology. **       Final report electronically signed by Dr. Sharyle Compton on 10/28/2017 9:27 AM       Radiology: [facility] Chest W Wo Contrast    Result Date: 10/27/2017  PROCEDURE: CTA CHEST W WO CONTRAST CLINICAL INFORMATION: DYSPNEA, ON EXERTION,  shortness of breath and wheezing which began yesterday COMPARISON: No prior study. TECHNIQUE: 5 mm axial images were obtained through the chest after the administration of 80 mL Isovue-370 IV contrast.  A non-contrast localizer was obtained. MIP coronal and sagittal reconstructed images of the chest were also obtained. All CT scans at this facility use dose modulation, iterative reconstruction, and/or weight-based dosing when appropriate to reduce radiation dose to as low as reasonably achievable. FINDINGS: There is no evidence of pulmonary embolism. The pulmonary arteries are borderline prominent in size, measuring up to 2.6 cm in diameter. There is no thoracic aortic aneurysm or dissection. The heart is not enlarged. There is no pericardial effusion. There are calcified mediastinal lymph nodes. The tracheobronchial tree is patent. Lung windows reveal a large right pneumothorax, estimated at approximately 50 percent. Extensive background emphysematous changes are noted bilaterally most severe at the upper lobes. There is a thoracic dextroscoliosis. There are degenerative changes throughout the spine. There are prominent Schmorl's nodes. There is a probable bone island to the left of midline at the level of T4.     1. Large right pneumothorax, estimated at approximately 50 percent. 2. No evidence of pulmonary embolism. 3. Severe bilateral emphysematous lung changes. 4. These results were called to Dr. Christophe Sheridan at the [facility] emergency department on 10/27/2017 at approximately 1515 hours. **This report has been created using voice recognition software. It may contain minor errors which are inherent in voice recognition technology. ** Final report electronically signed by Dr. Catalina Small on 10/27/2017 3:15 PM    Xr Chest Portable    Result Date: 10/27/2017  PROCEDURE: XR CHEST PORTABLE CLINICAL INFORMATION: 50% pneumothorax status post chest tube placement,  . COMPARISON: 9/20/2014 and CT chest 10/27/2017 TECHNIQUE: 2 portable upright images FINDINGS: Right thoracotomy tube has been placed. There is currently no evidence of a right-sided pneumothorax. Lungs are severely emphysematous. Chronic postinflammatory changes are seen in the Hilar distribution. Heart size is normal. Pulmonary vessels are not congested. Bony thorax is osteopenic. No pneumothorax post right thoracotomy tube placement **This report has been created using voice recognition software. It may contain minor errors which are inherent in voice recognition technology. ** Final report electronically signed by Dr. Xena Segal on 10/27/2017 4:55 PM    Xr Chest 1 Vw    Result Date: 10/28/2017  PROCEDURE: XR CHEST LIMITED ONE VIEW CLINICAL INFORMATION: R Pneumo,  COMPARISON: 10/27/2017 TECHNIQUE:  AP mobile chest  FINDINGS: The cardiomediastinal silhouette appears within normal limits.  There is a large-bore right-sided chest tube present with the tip overlying

## 2017-11-01 ENCOUNTER — APPOINTMENT (OUTPATIENT)
Dept: GENERAL RADIOLOGY | Age: 82
DRG: 199 | End: 2017-11-01
Payer: MEDICARE

## 2017-11-01 VITALS
WEIGHT: 94.25 LBS | BODY MASS INDEX: 14.79 KG/M2 | TEMPERATURE: 97.8 F | OXYGEN SATURATION: 99 % | SYSTOLIC BLOOD PRESSURE: 136 MMHG | DIASTOLIC BLOOD PRESSURE: 78 MMHG | HEART RATE: 89 BPM | RESPIRATION RATE: 18 BRPM | HEIGHT: 67 IN

## 2017-11-01 LAB
ANION GAP SERPL CALCULATED.3IONS-SCNC: 12 MEQ/L (ref 8–16)
BUN BLDV-MCNC: 14 MG/DL (ref 7–22)
CALCIUM SERPL-MCNC: 8.7 MG/DL (ref 8.5–10.5)
CHLORIDE BLD-SCNC: 100 MEQ/L (ref 98–111)
CO2: 28 MEQ/L (ref 23–33)
CREAT SERPL-MCNC: 0.6 MG/DL (ref 0.4–1.2)
GFR SERPL CREATININE-BSD FRML MDRD: > 90 ML/MIN/1.73M2
GLUCOSE BLD-MCNC: 93 MG/DL (ref 70–108)
HCT VFR BLD CALC: 30.5 % (ref 37–47)
HEMOGLOBIN: 10.2 GM/DL (ref 12–16)
MCH RBC QN AUTO: 31.9 PG (ref 27–31)
MCHC RBC AUTO-ENTMCNC: 33.5 GM/DL (ref 33–37)
MCV RBC AUTO: 95.3 FL (ref 81–99)
PDW BLD-RTO: 13.5 % (ref 11.5–14.5)
PLATELET # BLD: 269 THOU/MM3 (ref 130–400)
PMV BLD AUTO: 8.2 MCM (ref 7.4–10.4)
POTASSIUM SERPL-SCNC: 3.6 MEQ/L (ref 3.5–5.2)
RBC # BLD: 3.2 MILL/MM3 (ref 4.2–5.4)
SODIUM BLD-SCNC: 140 MEQ/L (ref 135–145)
WBC # BLD: 6.3 THOU/MM3 (ref 4.8–10.8)

## 2017-11-01 PROCEDURE — 6370000000 HC RX 637 (ALT 250 FOR IP): Performed by: NURSE PRACTITIONER

## 2017-11-01 PROCEDURE — 36415 COLL VENOUS BLD VENIPUNCTURE: CPT

## 2017-11-01 PROCEDURE — 99999 PR OFFICE/OUTPT VISIT,PROCEDURE ONLY: CPT | Performed by: NURSE PRACTITIONER

## 2017-11-01 PROCEDURE — 2580000003 HC RX 258: Performed by: SURGERY

## 2017-11-01 PROCEDURE — 85027 COMPLETE CBC AUTOMATED: CPT

## 2017-11-01 PROCEDURE — 80048 BASIC METABOLIC PNL TOTAL CA: CPT

## 2017-11-01 PROCEDURE — 99233 SBSQ HOSP IP/OBS HIGH 50: CPT | Performed by: SURGERY

## 2017-11-01 PROCEDURE — 71010 XR CHEST PORTABLE: CPT

## 2017-11-01 PROCEDURE — 6370000000 HC RX 637 (ALT 250 FOR IP): Performed by: SURGERY

## 2017-11-01 PROCEDURE — 94640 AIRWAY INHALATION TREATMENT: CPT

## 2017-11-01 RX ORDER — IPRATROPIUM BROMIDE AND ALBUTEROL SULFATE 2.5; .5 MG/3ML; MG/3ML
3 SOLUTION RESPIRATORY (INHALATION) EVERY 4 HOURS PRN
Qty: 360 ML | DISCHARGE
Start: 2017-11-01

## 2017-11-01 RX ADMIN — SODIUM CHLORIDE: 9 INJECTION, SOLUTION INTRAVENOUS at 02:48

## 2017-11-01 RX ADMIN — IPRATROPIUM BROMIDE AND ALBUTEROL SULFATE 1 AMPULE: .5; 3 SOLUTION RESPIRATORY (INHALATION) at 12:30

## 2017-11-01 RX ADMIN — IPRATROPIUM BROMIDE AND ALBUTEROL SULFATE 1 AMPULE: .5; 3 SOLUTION RESPIRATORY (INHALATION) at 08:06

## 2017-11-01 RX ADMIN — PANTOPRAZOLE SODIUM 40 MG: 40 TABLET, DELAYED RELEASE ORAL at 06:57

## 2017-11-01 RX ADMIN — Medication 10 ML: at 08:59

## 2017-11-01 RX ADMIN — BUSPIRONE HYDROCHLORIDE 10 MG: 10 TABLET ORAL at 08:58

## 2017-11-01 RX ADMIN — CLOPIDOGREL 75 MG: 75 TABLET, FILM COATED ORAL at 08:58

## 2017-11-01 RX ADMIN — ASPIRIN 81 MG: 81 TABLET, CHEWABLE ORAL at 08:58

## 2017-11-01 ASSESSMENT — PAIN SCALES - GENERAL: PAINLEVEL_OUTOF10: 0

## 2017-11-01 NOTE — PLAN OF CARE
Problem: Impaired respiratory status  Goal: Clear lung sounds  Outcome: Ongoing  Duoneb aerosol given as ordered.   Pt tolerated well with no adverse reactions noted

## 2017-11-01 NOTE — PROGRESS NOTES
results for input(s): PHOS in the last 72 hours. BNP:No results for input(s): BNP in the last 72 hours. Glucose:No results for input(s): POCGLU in the last 72 hours. HgbA1C: No results for input(s): LABA1C in the last 72 hours. INR: No results for input(s): INR in the last 72 hours. Hepatic:   No results for input(s): ALKPHOS, ALT, AST, PROT, BILITOT, BILIDIR, LABALBU in the last 72 hours. Amylase and Lipase:No results for input(s): LACTA, AMYLASE in the last 72 hours. Lactic Acid: No results for input(s): LACTA in the last 72 hours. Troponin: No results for input(s): CKTOTAL, CKMB, TROPONINT in the last 72 hours. BNP: No results for input(s): BNP in the last 72 hours. Lipids: No results for input(s): CHOL, TRIG, HDL, LDLCALC in the last 72 hours. Invalid input(s): LDL  ABGs: No results found for: PH, PCO2, PO2, HCO3, O2SAT    Radiology reports as per the Radiologist    Chest X Ray  11/1/17  Narrative   PROCEDURE: XR CHEST PORTABLE       CLINICAL INFORMATION: Chest tube removal 10/31/2017; assess for pneumothorax.       COMPARISON: 10/31/2017.       TECHNIQUE: AP portable chest radiograph performed.           FINDINGS:    POSTSURGICAL CHANGES: None.       LINES/TUBES/MECHANICAL DEVICES:    1. There is interval removal of the right sided chest tube. There is a stable small less than 5% right apical pneumothorax.       TRACHEA/HEART/MEDIASTINUM/HILUM:    1. There is stable mild atheromatous calcification thoracic aorta.       LUNG FIELDS:    1. The lung fields are emphysematous. There is no consolidation or infiltrates. There is stable minimal blunting of the lateral costophrenic angles bilaterally. There is no pulmonary vascular congestion. 2. There is a stable calcified granuloma at the left lung base.       OTHER: None.       PNEUMOTHORAX:     1. There is a stable small less than 5% right apical pneumothorax.       OSSEOUS STRUCTURES:   1. Generalized osteopenia.    2. Mild dextroscoliosis of the thoracic spine.               Impression   1. There is interval removal of the right sided chest tube. There is a stable small less than 5% right apical pneumothorax. 2. The lung fields are emphysematous. There is no consolidation or infiltrates. There is stable minimal blunting of the lateral costophrenic angles bilaterally. There is no pulmonary vascular congestion.           **This report has been created using voice recognition software.  It may contain minor errors which are inherent in voice recognition technology. **       Final report electronically signed by Dr. Aravind Hong on 11/1/2017 8:13 AM         Chest X Ray  10/30/17  Narrative   PROCEDURE: XR CHEST LIMITED ONE VIEW       CLINICAL INFORMATION: f/u pneumothorax, changed from suction to waterseal this am,, .  Chest tube in place since 10/27/2017. Some shortness of breath.       COMPARISON: Chest x-ray 10/29/2017.       TECHNIQUE: AP upright view of the chest.       FINDINGS:   There is a large bore right-sided chest tube. There is a small right apical pneumothorax. There is a small right-sided pleural effusion.       The heart size is normal. The mediastinum is not widened.  There are back on emphysematous changes. There are no pulmonary infiltrates. The pulmonary vascularity is normal.   There are degenerative changes in both shoulders.           Impression   1. Small right apical pneumothorax. This is approximately 5 %. There is a right-sided chest tube in place. 2. Background emphysema. Chest X Ray 1VW   10/28/17  PROCEDURE: XR CHEST LIMITED ONE VIEW       CLINICAL INFORMATION: R Pneumo,         COMPARISON: 10/27/2017       TECHNIQUE:  AP mobile chest         FINDINGS: The cardiomediastinal silhouette appears within normal limits. There is a large-bore right-sided chest tube present with the tip overlying the right upper lung zone. There is possible tiny right apical pneumothorax. Emphysema along the right    lateral chest wall.  No pneumothorax is seen. There is hyperinflation noted. There is a calcified granuloma overlying the left lung base laterally.           Impression   1. Possible tiny right apical pneumothorax. Otherwise stable portable chest with persistent hyperinflation. Large bore right-sided chest tube appears unchanged.               **This report has been created using voice recognition software.  It may contain minor errors which are inherent in voice recognition technology. **       Final report electronically signed by Dr. Carrington Felty on 10/28/2017 9:27 AM       Radiology: [facility] Chest W Wo Contrast    Result Date: 10/27/2017  PROCEDURE: CTA CHEST W WO CONTRAST CLINICAL INFORMATION: DYSPNEA, ON EXERTION,  shortness of breath and wheezing which began yesterday COMPARISON: No prior study. TECHNIQUE: 5 mm axial images were obtained through the chest after the administration of 80 mL Isovue-370 IV contrast.  A non-contrast localizer was obtained. MIP coronal and sagittal reconstructed images of the chest were also obtained. All CT scans at this facility use dose modulation, iterative reconstruction, and/or weight-based dosing when appropriate to reduce radiation dose to as low as reasonably achievable. FINDINGS: There is no evidence of pulmonary embolism. The pulmonary arteries are borderline prominent in size, measuring up to 2.6 cm in diameter. There is no thoracic aortic aneurysm or dissection. The heart is not enlarged. There is no pericardial effusion. There are calcified mediastinal lymph nodes. The tracheobronchial tree is patent. Lung windows reveal a large right pneumothorax, estimated at approximately 50 percent. Extensive background emphysematous changes are noted bilaterally most severe at the upper lobes. There is a thoracic dextroscoliosis. There are degenerative changes throughout the spine. There are prominent Schmorl's nodes.  There is a probable bone island to the left of midline at the level of T4.     1. Large portable chest with persistent hyperinflation. Large bore right-sided chest tube appears unchanged. **This report has been created using voice recognition software. It may contain minor errors which are inherent in voice recognition technology. ** Final report electronically signed by Dr. Alyssa Pacheco on 10/28/2017 9:27 AM     PROCEDURE: XR CHEST LIMITED ONE VIEW       CLINICAL INFORMATION: follow up pneumothorax,         COMPARISON: 10/28/2017       TECHNIQUE:  AP mobile chest         FINDINGS: There is a large-bore right-sided chest tube present with the tip overlying the right upper lung. The heart size is normal. There is hyperinflation noted. There is mild subcutaneous soft tissue emphysema along the right lateral chest wall again    seen. There is S-shaped curvature of the mid and upper thoracic spine. There is a tiny residual right thorax which appears decreased from prior examination. This is less than 5 percent.           Impression   1. Stable portable chest with persistent hyperinflation. 2. Tiny residual pneumothorax at the right apex appears decreased from prior examination. This represents a less than 5 percent pneumothorax.               **This report has been created using voice recognition software.  It may contain minor errors which are inherent in voice recognition technology. **       Final report electronically signed by Dr. Alyssa Pacheco on 10/29/2017 9:18 AM             Physical Exam:  Vitals: /60   Pulse 85   Temp 97.5 °F (36.4 °C) (Oral)   Resp 16   Ht 5' 7\" (1.702 m)   Wt 94 lb 4 oz (42.8 kg)   SpO2 94%   BMI 14.76 kg/m²   24 hour intake/output:    Intake/Output Summary (Last 24 hours) at 11/01/17 0759  Last data filed at 11/01/17 0341   Gross per 24 hour   Intake          1591.38 ml   Output             1100 ml   Net           491.38 ml     Last 3 weights:   Wt Readings from Last 3 Encounters:   11/01/17 94 lb 4 oz (42.8 kg)       General appearance - oriented to person, place, and time and frail  HEENT: Normocephalic and Atraumatic  Chest - exp wheeze noted throughout, diminished breath sounds bases  Cardiovascular - irregular rhythm, chest tube removed  Abdomen - soft, nontender, nondistended, no masses or organomegaly   Neurological - Alert and oriented and Normal speech  Integumentary - Skin color, texture, turgor normal. No Rashes or lesions  Musculoskeletal -Full ROM times 4 extremities, No clubbing or cyanosis and No peripheral edema       DVT prophylaxis: [x] Lovenox                                 [] SCDs                                 [] SQ Heparin                                 [] Encourage ambulation           [] Already on Anticoagulation                 Assessment:  1. Pneumothorax small residual,  chest tube removed  2. SOB improving  3. Fall prior to admission at home   4. Oxygen prn  5. Chest x ray - improved pneumothorax, very small residual pneumothorax at the right apex appears decreased from prior examination  6. pacemaker none per family  7. Weakness and confusion at times  8. Tobacco dependence  9. AFIB at [facility], resolved - now sinus arrhythmia reviewed by Dr Omar Adan   10. Severe protein-calorie malnutrition in the setting of poor oral intake and weight loss       Active Problems:    Primary spontaneous pneumothorax    Severe malnutrition ([address]      Plan:  1. Chest tube removed  2. General diet  3. Duonebs, IS, ambulate   4. PT/OT asked to eval and treat   5. I&O  6. IV hydration  7. Analgesia and antiemetics as needed  8. Telemetry- sinus arrhythmia   9. Monitor Labs  10. Repeat chest x ray later today   11. Clarified with the family patient does not have a pacemaker  12. Okay to discharge pending precert      Electronically signed by Bessy Ruiz CNP on 11/1/2017 at 7:59 AM Patient seen and examined independently by me. Above discussed and I agree with CNP. Labs, cultures, and radiographs where available were reviewed.    See orders for the updated patient care Yumiko Alvarez MD, CT removed last PM BS =  Personally reviewed CXR  Agree with report  OK for NH TRO  11/1/2017   9:06 AM

## 2017-11-01 NOTE — PLAN OF CARE
Problem: Falls - Risk of  Goal: Absence of falls  Outcome: Ongoing  Patient free of falls this shift. Problem: Risk for Impaired Skin Integrity  Goal: Tissue integrity - skin and mucous membranes  Structural intactness and normal physiological function of skin and  mucous membranes. Outcome: Ongoing  Patient has Stage 2 on buttocks. Barrier cream applied. Patient being turned every 2 hours. Problem: Pain:  Goal: Pain level will decrease  Pain level will decrease   Outcome: Ongoing  Patient denies pain this shift. Goal: Control of acute pain  Control of acute pain   Outcome: Met This Shift    Goal: Control of chronic pain  Control of chronic pain   Outcome: Met This Shift      Problem: Nutrition  Goal: Optimal nutrition therapy  Outcome: Ongoing  Patient reports having a good appetite. Problem: DISCHARGE BARRIERS  Goal: Patient's continuum of care needs are met  Outcome: Ongoing  Patient will be discharged to [facility]. Comments: Care plan reviewed with patient. Patient verbalizes understanding of the plan of care and contribute to goal setting.

## 2017-11-02 LAB
BLOOD CULTURE, ROUTINE: NORMAL
BLOOD CULTURE, ROUTINE: NORMAL

## 2017-11-13 NOTE — DISCHARGE SUMMARY
Discharge Summary     Patient Identification:  Ximena Hernández  : 1935  MRN: [id number]   Account: [de-identified]     Admit date: 10/27/2017  Discharge date: 17  Attending provider: Dr Mary Gordon   Primary care provider: Olivia Lacy     Discharge Diagnoses: Active Problems:    Primary spontaneous pneumothorax    Severe malnutrition [facility])       Hospital Course:   Ximena Hernández is a [de-identified] y.o. female admitted to [address] [place] [address] on 10/27/2017 for Right pneumothorax. She was transferred from [facility] ambulatory care with a chest tube that was placed by Dr Carlos Moran. She was admitted to [facility] and managed with chest tube to suction with serial chest x rays,  analgesics for pain control, IV fluid hydration, GI and DVT prophylaxis. She was treated with duonebs. Over the hospital stay she readily improved in ability to tolerate increasing levels of activity and to take po fluids, solid foods with evidence of returning bowel function, and spontaneously voiding. The chest tube was changed to water seal for the course of a few days and then removed, with continued chest x rays. At the time of discharge she was able to tolerate pain with oral analgesic and was medically stable. The family was concerned with her returning home so she was discharged to [facility].             Procedures:   None     Code Status: Prior     Consults:   none    Examination:  Vitals:  Vitals:    10/31/17 1921 17 0341 17 0800 17 1119   BP: (!) 168/81 132/60 (!) 144/73 136/78   Pulse: 95 85 92 89   Resp: 16  18 18   Temp: 98.3 °F (36.8 °C) 97.5 °F (36.4 °C) 97.6 °F (36.4 °C) 97.8 °F (36.6 °C)   TempSrc: Oral Oral Oral Oral   SpO2: 96% 94% 95% 99%   Weight:  94 lb 4 oz (42.8 kg)     Height:         Weight: Weight: 94 lb 4 oz (42.8 kg)     24 hour intake/output:No intake or output data in the 24 hours ending 17 1429    Physical assessment completed on the daily progress note     Significant Diagnostics: XR CHEST PORTABLE CLINICAL INFORMATION: 50% pneumothorax status post chest tube placement,  . COMPARISON: 9/20/2014 and CT chest 10/27/2017 TECHNIQUE: 2 portable upright images FINDINGS: Right thoracotomy tube has been placed. There is currently no evidence of a right-sided pneumothorax. Lungs are severely emphysematous. Chronic postinflammatory changes are seen in the Hilar distribution. Heart size is normal. Pulmonary vessels are not congested. Bony thorax is osteopenic. No pneumothorax post right thoracotomy tube placement **This report has been created using voice recognition software. It may contain minor errors which are inherent in voice recognition technology. ** Final report electronically signed by Dr. Christina Leung on 10/27/2017 4:55 PM    Xr Chest 1 Vw    Result Date: 10/28/2017  PROCEDURE: XR CHEST LIMITED ONE VIEW CLINICAL INFORMATION: R Pneumo,  COMPARISON: 10/27/2017 TECHNIQUE:  AP mobile chest  FINDINGS: The cardiomediastinal silhouette appears within normal limits. There is a large-bore right-sided chest tube present with the tip overlying the right upper lung zone. There is possible tiny right apical pneumothorax. Emphysema along the right lateral chest wall. No pneumothorax is seen. There is hyperinflation noted. There is a calcified granuloma overlying the left lung base laterally. 1. Possible tiny right apical pneumothorax. Otherwise stable portable chest with persistent hyperinflation. Large bore right-sided chest tube appears unchanged. **This report has been created using voice recognition software. It may contain minor errors which are inherent in voice recognition technology. ** Final report electronically signed by Dr. Jostin Hughes on 10/28/2017 9:27 AM      Labs: No results found for this or any previous visit (from the past 72 hour(s)).     Patient Instructions:    Discharge lab work: none   Activity: activity as tolerated  Diet: Dietary Nutrition Supplements: Standard High Calorie Oral Supplement      Follow-up visits:   CM STR [facility] Narcisa Ye [address] Physician to follow    [address] [place] [address]  [place] [address]  [address] [address] [phone]    Call  General Surgeon, As needed, if symptoms worsen       Discharge condition: fair  Disposition: Skilled Facility  Time spent on discharge: 35 minutes     Discharge Medications:   [facility] Medication Instructions FPP:[id number]    Printed on:11/13/17 1424   Medication Information                      albuterol sulfate  (90 Base) MCG/ACT inhaler  Inhale 2 puffs into the lungs every 4 hours as needed for Shortness of Breath             aspirin 81 MG tablet  Take 81 mg by mouth daily             busPIRone (BUSPAR) 10 MG tablet  Take 10 mg by mouth 2 times daily              clopidogrel (PLAVIX) 75 MG tablet  Take 75 mg by mouth daily             ezetimibe (ZETIA) 10 MG tablet  Take 10 mg by mouth daily             [id number] 3-Phytosterols (BP VIT 3 PO)  Take 1 capsule by mouth daily             ipratropium-albuterol (DUONEB) 0.5-2.5 (3) MG/3ML SOLN nebulizer solution  Inhale 3 mLs into the lungs every 4 hours as needed for Shortness of Breath             levothyroxine (SYNTHROID) 25 MCG tablet  Take 25 mcg by mouth Daily             loratadine (CLARITIN) 10 MG capsule  Take 10 mg by mouth daily             meclizine (ANTIVERT) 25 MG tablet  Take 25 mg by mouth daily as needed             nitroGLYCERIN (NITROSTAT) 0.4 MG SL tablet  Place 0.4 mg under the tongue every 5 minutes as needed for Chest pain up to max of 3 total doses. If no relief after 1 dose, call 911.              pravastatin (PRAVACHOL) 80 MG tablet  Take 80 mg by mouth daily               Discharge Summary completed on behalf of Dr Mini Gellre   Electronically signed by Ashlyn Liu CNP on 11/13/2017 at 2:29 PM

## 2017-11-22 ENCOUNTER — HOSPITAL ENCOUNTER (OUTPATIENT)
Dept: GENERAL RADIOLOGY | Age: 82
Discharge: HOME | End: 2017-11-22
Payer: COMMERCIAL

## 2017-11-22 DIAGNOSIS — R13.10 DYSPHAGIA, UNSPECIFIED TYPE: ICD-10-CM

## 2017-11-22 PROCEDURE — G8998 SWALLOW D/C STATUS: HCPCS | Performed by: SPEECH-LANGUAGE PATHOLOGIST

## 2017-11-22 PROCEDURE — 74230 X-RAY XM SWLNG FUNCJ C+: CPT

## 2017-11-22 PROCEDURE — 2500000003 HC RX 250 WO HCPCS: Performed by: FAMILY MEDICINE

## 2017-11-22 PROCEDURE — G8997 SWALLOW GOAL STATUS: HCPCS | Performed by: SPEECH-LANGUAGE PATHOLOGIST

## 2017-11-22 PROCEDURE — G8996 SWALLOW CURRENT STATUS: HCPCS | Performed by: SPEECH-LANGUAGE PATHOLOGIST

## 2017-11-22 PROCEDURE — 92611 MOTION FLUOROSCOPY/SWALLOW: CPT | Performed by: SPEECH-LANGUAGE PATHOLOGIST

## 2017-11-22 RX ADMIN — BARIUM SULFATE 60 ML: 0.81 POWDER, FOR SUSPENSION ORAL at 11:03

## 2017-11-22 RX ADMIN — BARIUM SULFATE 60 ML: 0.6 SUSPENSION ORAL at 11:03

## 2017-11-22 NOTE — PROGRESS NOTES
[facility]  SPEECH THERAPY  STRZ RADIOLOGY  Modified Barium Swallow    [x] [facility]   [] Acute Care [] Transitional Care Unit [] IP Rehab    Date: 2017  Patient Name: Placido Everett      CSN: [id number]   : 1935  ([de-identified] y.o.)  Gender: female   Referring Physician:  Dr. Kat Galdamez  Diagnosis: dysphagia  Secondary Diagnosis:  dysphagia  Insurance/Certification Information: Medicare  History of Present Illness/Injury: Patient is a resident of [facility]. Patient's daughter reports the patient was in the hospital with a collapsed lung and has been working with speech therapy for dysphagia intervention d/t occasional coughing during meals. Patient used to smoke, but denies any history of pneumonia. MBS to assess current swallow function for safest diet recommendations. Patient  has a past medical history of CAD (coronary artery disease) and Thyroid disease. Pain:  None, 0/10.     Current Diet: Regular with thin liquids    Respiratory Status: [x] Independent [] Nasal Cannula [] Oxygen Mask      [] Tracheostomy [] Other:     [] Ventilator/Settings:    Behavioral Observation: [x] Alert [] Oriented   [] Confused [] Lethargic      [] Dysarthric [] Limited Direction Following  [] Agitated [] Other:    PATIENT WAS EVALUATED USING:  Puree, soft, solid, thin liquids    ORAL PREPARATION PHASE: [] WFL  [x] Impaired   [] Slow mastication [] Uncoordinated mastication [x] Decreased bolus control   [x] Decreased bolus formation [] Loss of bolus from lips / Anterior spillage   [] OTHER:    ORAL PHASE: [] WFL  [x] Impaired   [] Residue in the anterior sulcus [] Residue in the lateral sulcus  right   [] Residue in the lateral sulcus  left [] Uncoordinated AP movement   [x] Slow AP movement   [] Decreased lingual elevation   [x] Decreased tongue base retraction [x] Uncontrolled bolus / diffuse fall over tongue base - spillage to the pyriform sinuses   [] Piecemeal deglutition    [] OTHER:     ORAL PHASE TAMMI SCORE: (Dysphagia outcome and severity scale)  [] 7 = Normal in all situations  [] 6 = WFL / Mod I; Normal diet; May have mild oral delay  [x] 5 = Mild dysphagia; May have one diet consistency restricted; Mild oral residue clears. [] 4 = Mild-Moderate dysphagia; May have one or two diet consistencies restricted; Oral residue clears with cue; Intermittent supervision or cueing. [] 3 = Moderate dysphagia; Total assist with strategies; Two or more consistencies restricted; Moderate oral residue clears with cue;   [] 2 = Moderately Severe dysphagia; Tolerates at least one consistency safely with total use of strategies; Severe oral residue and unable to clear or needs multiple cues; Non-oral nutrition. [] 1 = Severe dysphagia; NPO; Unable to tolerate any po safely; Severe oral loss of bolus or oral residue; Non-oral nutrition. PHARYNGEAL PHASE: [] WFL  [x] Impaired   [] Absent Swallow  [x] Delayed Swallow [] Decreased airway protection   [x] Decreased epiglottic inversion - mild [x] Decreased hyolaryngeal elevation - mild   [x] min to mod residue in the valleculae [] Residue in the pyriform sinus    [] Cricopharyngeal dysfunction  [] Residue along posterior pharyngeal wall   [] Residue along the ariepiglottic folds. [] OTHER:    PHARYNGEAL PHASE TAMMI SCORE: (Dysphagia outcome and severity scale)  [] 7 = Normal in all situations; Normal diet; No strategies  [] 6 = WFL / Mod I; May have mild pharyngeal delay or residue but clears spontaneously; No aspiration or laryngeal penetration  [x] 5 = Mild dysphagia:  May need one consistency restricted; May have one or more of the following:  Aspiration with thin  cough to clear; Airway penetration midway to the vocal cords with one or more consistency or to the vocal folds with one consistency, but clears spontaneously; Residue in the pharynx clears spontaneously. [] 4 = Mild  Moderate dysphagia: One or two consistencies restricted;  May exhibit one or more of the following:  Residue clears with cue; Aspiration of one consistency with weak or no reflexive cough; Laryngeal penetration to the vocal cords with cough with two consistencies; Laryngeal penetration to the vocal cords without cough on one consistency. [] 3 = Moderate dysphagia:  Two or more diet consistencies restricted; May exhibit one or more of the following: Moderate residue clears with cue; Airway penetration to the level of the vocal folds without cough with two or more consistencies; Aspiration with two consistencies with weak or no reflexive cough; Aspiration of one consistency, no cough and airway penetration with one consistency, no cough. [] 2 = Moderately Severe dysphagia: Tolerates at least one consistency safely with total use of strategies; Non-oral nutrition; Severe residue unable to clear; Aspiration with two or more consistencies with no cough; Aspiration with one or more consistency, no cough and airway penetration to the vocal folds with one or more consistency, no cough. [] 1 = Severe dysphagia:  NPO; Unable to tolerate any po safely; Severe residue unable to clear; Silent aspiration with two or more consistencies, non-functional cough;  OR Unable to achieve a swallow.       SIGNS AND SYMPTOMS OF LARYNGEAL PENETRATION / ASPIRATION:  [x] No evidence of laryngeal penetration  [x] No evidence of aspiration  [] Laryngeal penetration evident with:  [] Audible aspiration evident with:  [] Silent aspiration evident with:  **coughing noted x 1 throughout the study, however, no laryngeal penetration/aspiration noted  **Patient is at risk for aspiration in an uncontrolled setting given the decreased bolus control    PENETRATION-ASPIRATION SCALE (PAS):  [x] 1 = Material does not enter the airway  [] 2 = Material enters the airway, remains above the vocal folds, and is ejected from the airway  [] 3 = Material enters the airway, remains above the vocal folds, and is not ejected from airway  [] 4 = Material enters the airway, contacts the vocal folds, and is ejected from the airway  [] 5 = Material enters the airway, contacts the vocal folds, and is not ejected from the airway  [] 6 = Material enters the airway, passes below the vocal folds, and is ejected into the larynx or out of the airway  [] 7 = Material enters the airway, passes below the vocal folds, and is not ejected from the trachea despite effort  [] 8 = Material enters the airway, passes below the vocal folds, and no effort is made to eject    ESOPHAGEAL PHASE:   [x] No significant findings at the level of the UES  [] See Radiology Report for details  [] Recommend further esophageal testing    ATTEMPTED TECHNIQUES:      Comments:  [x]Small bolus size [x] Effective [] Not Effective    []Straw [] Effective [] Not Effective    []Cup [] Effective [] Not Effective    []Chin tuck [] Effective [] Not Effective    []Head Turn [] Effective [] Not Effective    []Spoon presentations [] Effective [] Not Effective    [] Volitional cough [] Effective [] Not Effective    [] Spontaneous cough [] Effective [] Not Effective    [x]OTHER: multiple swallows [x] Effective - partially [] Not Effective      DIAGNOSTIC IMPRESSIONS:  Patient presents with mild oropharyngeal dysphagia characterized by slow AP movement, decreased bolus formation and control, reduced tongue base retraction, delayed swallow, decreased hyolaryngeal elevation and epiglottic movement and residue in the valleculae. Patient with decreased bolus control with all consistencies completed, however, increased difficulty with mixed consistencies. No laryngeal penetration or aspiration evident throughout this evaluation, however, patient is at risk for aspiration in an uncontrolled setting. DIET RECOMMENDATIONS:  Regular with thin liquids         **Ok to have mixed consistencies if quantity is controlled well.   If overt difficulty noted with mixed consistencies, then recommend avoiding them. STRATEGIES:   [x] Full upright position  [x] Small bite/sip   [x] No Straw   [x] Multiple Swallow - 2 swallows  [] Chin tuck   [] Head turn   [] Pulmonary monitoring [] Oral care after all meals  [] Supervision   [] Medication in applesauce   []Direct 1:1 Supervision [] Spoon all liquids   [] Alternate solid / liquid [x] Limit distractions   [] Monitor for fatigue  [] PMV in place for all po   [x] OTHER: slow rate    EDUCATION:   Learner: [x]Patient [] Significant other [x] Daughter, Dejuan Watson [] Parent     [] Other:   Education: [x] Reviewed results and recommendations of this evaluation     [x] Reviewed diet and strategies     [x] Reviewed signs, symptoms and risk of aspiration     [] Demonstrated how to thick liquid appropriately. [x] Recommendation to continue dysphagia treatment     [x] OTHER: avoid mixed consistencies if overt difficulty noted with them   Method: [x] Discussion [] Demonstration [] Hand-out     [] OTHER:   Evaluation of Education:     [x] Verbalizes understanding [] Demonstrates with assistance     [] Demonstrates without assistance [x]Needs further instruction     [] No evidence of learning  [] Family not present    PATIENT GOALS: [x] Pt did not state; Will further assess during treatment. [] Return to the least restricted diet possible     [] Return to previous level of function     [] OTHER:    PLAN / TREATMENT RECOMMENDATIONS:  [] No further speech therapy services indicated. [] Speech Therapy evaluation to assess speech, language, cognition and/or voice  [x] Skilled dysphagia treatment per the treating SLP at the [facility]. Recommend to focus on bolus control, swallow initiation and overall strength. TIME IN: 1045  TIME OUT: 1111  UNTIMED TREATMENT: 26  TIMED TREATMENT: 0  TOTAL TIME: 26 minutes      SLP G-Codes  Functional Assessment Tool Used: Functional Communication Measures  Score: 5  Functional Limitations: Swallowing  Swallow Current Status ():  At

## 2018-01-01 ENCOUNTER — HOSPITAL ENCOUNTER (OUTPATIENT)
Age: 83
Discharge: HOME | End: 2018-10-28
Payer: MEDICARE

## 2018-01-01 LAB
AVERAGE GLUCOSE: 111 MG/DL (ref 70–126)
HBA1C MFR BLD: 5.7 % (ref 4.4–6.4)

## 2018-01-01 PROCEDURE — 83036 HEMOGLOBIN GLYCOSYLATED A1C: CPT

## 2018-01-01 PROCEDURE — 36415 COLL VENOUS BLD VENIPUNCTURE: CPT

## 2018-05-10 ENCOUNTER — HOSPITAL ENCOUNTER (EMERGENCY)
Age: 83
Discharge: HOME | End: 2018-05-10
Attending: FAMILY MEDICINE
Payer: MEDICARE

## 2018-05-10 VITALS
RESPIRATION RATE: 18 BRPM | HEART RATE: 88 BPM | OXYGEN SATURATION: 100 % | SYSTOLIC BLOOD PRESSURE: 160 MMHG | HEIGHT: 65 IN | DIASTOLIC BLOOD PRESSURE: 72 MMHG | BODY MASS INDEX: 15.49 KG/M2 | WEIGHT: 93 LBS | TEMPERATURE: 97.6 F

## 2018-05-10 DIAGNOSIS — B95.62 MRSA CELLULITIS OF LEFT FOOT: Primary | ICD-10-CM

## 2018-05-10 DIAGNOSIS — L03.116 MRSA CELLULITIS OF LEFT FOOT: Primary | ICD-10-CM

## 2018-05-10 PROCEDURE — 99282 EMERGENCY DEPT VISIT SF MDM: CPT

## 2018-05-10 RX ORDER — CEPHALEXIN 500 MG/1
500 CAPSULE ORAL 3 TIMES DAILY
Qty: 30 CAPSULE | Refills: 0 | Status: SHIPPED | OUTPATIENT
Start: 2018-05-10 | End: 2018-05-20

## 2018-05-10 RX ORDER — CIPROFLOXACIN 500 MG/1
500 TABLET, FILM COATED ORAL 2 TIMES DAILY
Qty: 20 TABLET | Refills: 0 | Status: SHIPPED | OUTPATIENT
Start: 2018-05-10 | End: 2018-05-20

## 2018-06-23 ENCOUNTER — HOSPITAL ENCOUNTER (OUTPATIENT)
Age: 83
Discharge: HOME | End: 2018-06-23
Payer: MEDICARE

## 2018-06-23 LAB
ALBUMIN SERPL-MCNC: 4.3 G/DL (ref 3.5–5.1)
ALP BLD-CCNC: 103 U/L (ref 38–126)
ALT SERPL-CCNC: 10 U/L (ref 11–66)
ANION GAP SERPL CALCULATED.3IONS-SCNC: 13 MEQ/L (ref 8–16)
AST SERPL-CCNC: 18 U/L (ref 5–40)
AVERAGE GLUCOSE: 111 MG/DL (ref 70–126)
BASOPHILS # BLD: 1 %
BASOPHILS ABSOLUTE: 0.1 THOU/MM3 (ref 0–0.1)
BILIRUB SERPL-MCNC: 0.6 MG/DL (ref 0.3–1.2)
BILIRUBIN DIRECT: < 0.2 MG/DL (ref 0–0.3)
BUN BLDV-MCNC: 10 MG/DL (ref 7–22)
CALCIUM SERPL-MCNC: 10.2 MG/DL (ref 8.5–10.5)
CHLORIDE BLD-SCNC: 99 MEQ/L (ref 98–111)
CHOLESTEROL, TOTAL: 176 MG/DL (ref 100–199)
CO2: 27 MEQ/L (ref 23–33)
CREAT SERPL-MCNC: 0.7 MG/DL (ref 0.4–1.2)
EOSINOPHIL # BLD: 3.4 %
EOSINOPHILS ABSOLUTE: 0.2 THOU/MM3 (ref 0–0.4)
GFR SERPL CREATININE-BSD FRML MDRD: 80 ML/MIN/1.73M2
GLUCOSE BLD-MCNC: 96 MG/DL (ref 70–108)
HBA1C MFR BLD: 5.7 % (ref 4.4–6.4)
HCT VFR BLD CALC: 39.3 % (ref 37–47)
HDLC SERPL-MCNC: 89 MG/DL
HEMOGLOBIN: 12.9 GM/DL (ref 12–16)
LDL CHOLESTEROL CALCULATED: 72 MG/DL
LYMPHOCYTES # BLD: 20.1 %
LYMPHOCYTES ABSOLUTE: 1.2 THOU/MM3 (ref 1–4.8)
MCH RBC QN AUTO: 31.5 PG (ref 27–31)
MCHC RBC AUTO-ENTMCNC: 32.8 GM/DL (ref 33–37)
MCV RBC AUTO: 95.8 FL (ref 81–99)
MONOCYTES # BLD: 8.3 %
MONOCYTES ABSOLUTE: 0.5 THOU/MM3 (ref 0.4–1.3)
NUCLEATED RED BLOOD CELLS: 0 /100 WBC
PDW BLD-RTO: 14.1 % (ref 11.5–14.5)
PLATELET # BLD: 329 THOU/MM3 (ref 130–400)
PMV BLD AUTO: 8.1 FL (ref 7.4–10.4)
POTASSIUM SERPL-SCNC: 4.5 MEQ/L (ref 3.5–5.2)
RBC # BLD: 4.1 MILL/MM3 (ref 4.2–5.4)
SEG NEUTROPHILS: 67.2 %
SEGMENTED NEUTROPHILS ABSOLUTE COUNT: 4.1 THOU/MM3 (ref 1.8–7.7)
SODIUM BLD-SCNC: 139 MEQ/L (ref 135–145)
T4 FREE: 1.6 NG/DL (ref 0.93–1.76)
TOTAL PROTEIN: 7.4 G/DL (ref 6.1–8)
TRIGL SERPL-MCNC: 75 MG/DL (ref 0–199)
TSH SERPL DL<=0.05 MIU/L-ACNC: 3.59 UIU/ML (ref 0.4–4.2)
WBC # BLD: 6.1 THOU/MM3 (ref 4.8–10.8)

## 2018-06-23 PROCEDURE — 83036 HEMOGLOBIN GLYCOSYLATED A1C: CPT

## 2018-06-23 PROCEDURE — 84439 ASSAY OF FREE THYROXINE: CPT

## 2018-06-23 PROCEDURE — 82248 BILIRUBIN DIRECT: CPT

## 2018-06-23 PROCEDURE — 36415 COLL VENOUS BLD VENIPUNCTURE: CPT

## 2018-06-23 PROCEDURE — 80053 COMPREHEN METABOLIC PANEL: CPT

## 2018-06-23 PROCEDURE — 80061 LIPID PANEL: CPT

## 2018-06-23 PROCEDURE — 84443 ASSAY THYROID STIM HORMONE: CPT

## 2018-06-23 PROCEDURE — 85025 COMPLETE CBC W/AUTO DIFF WBC: CPT

## 2019-01-01 ENCOUNTER — HOSPITAL ENCOUNTER (EMERGENCY)
Age: 84
Discharge: HOME | End: 2019-05-28
Attending: FAMILY MEDICINE
Payer: MEDICARE

## 2019-01-01 ENCOUNTER — APPOINTMENT (OUTPATIENT)
Dept: CT IMAGING | Age: 84
End: 2019-01-01
Payer: MEDICARE

## 2019-01-01 ENCOUNTER — APPOINTMENT (OUTPATIENT)
Dept: GENERAL RADIOLOGY | Age: 84
End: 2019-01-01
Payer: MEDICARE

## 2019-01-01 VITALS
SYSTOLIC BLOOD PRESSURE: 158 MMHG | WEIGHT: 103 LBS | HEIGHT: 66 IN | RESPIRATION RATE: 18 BRPM | OXYGEN SATURATION: 96 % | DIASTOLIC BLOOD PRESSURE: 69 MMHG | TEMPERATURE: 97.9 F | HEART RATE: 97 BPM | BODY MASS INDEX: 16.55 KG/M2

## 2019-01-01 DIAGNOSIS — J44.9 CHRONIC OBSTRUCTIVE PULMONARY DISEASE, UNSPECIFIED COPD TYPE (HCC): ICD-10-CM

## 2019-01-01 DIAGNOSIS — R41.0 CONFUSION: Primary | ICD-10-CM

## 2019-01-01 DIAGNOSIS — F03.90 DEMENTIA WITHOUT BEHAVIORAL DISTURBANCE, UNSPECIFIED DEMENTIA TYPE: ICD-10-CM

## 2019-01-01 DIAGNOSIS — Z51.5 HOSPICE CARE: Primary | ICD-10-CM

## 2019-01-01 LAB
ALBUMIN SERPL-MCNC: 3.3 GM/DL (ref 3.4–5)
ALP BLD-CCNC: 137 U/L (ref 46–116)
ALT SERPL-CCNC: 11 U/L (ref 14–63)
ANION GAP: 8 MEQ/L (ref 8–16)
AST SERPL-CCNC: 15 U/L (ref 15–37)
BASOPHILS # BLD: 0.3 % (ref 0–3)
BILIRUB SERPL-MCNC: 0.5 MG/DL (ref 0.2–1)
BILIRUBIN URINE: NEGATIVE
BLOOD, URINE: NEGATIVE
BUN BLDV-MCNC: 12 MG/DL (ref 7–18)
CHARACTER, URINE: CLEAR
CHLORIDE BLD-SCNC: 99 MEQ/L (ref 98–107)
CO2: 29 MEQ/L (ref 21–32)
COLOR: YELLOW
CREAT SERPL-MCNC: 0.9 MG/DL (ref 0.6–1.3)
EOSINOPHILS RELATIVE PERCENT: 2.4 % (ref 0–4)
GFR, ESTIMATED: 64 ML/MIN/1.73M2
GLUCOSE BLD-MCNC: 101 MG/DL (ref 74–106)
GLUCOSE, URINE: NEGATIVE MG/DL
HCT VFR BLD CALC: 30.3 % (ref 37–47)
HEMOGLOBIN: 9.6 GM/DL (ref 12–16)
KETONES, URINE: NEGATIVE
LEUKOCYTE ESTERASE, URINE: NEGATIVE
LYMPHOCYTES # BLD: 11.2 % (ref 15–47)
MCH RBC QN AUTO: 26.6 PG (ref 27–31)
MCHC RBC AUTO-ENTMCNC: 31.7 GM/DL (ref 33–37)
MCV RBC AUTO: 84 FL (ref 81–99)
MONOCYTES: 7.6 % (ref 0–12)
NITRITE, URINE: NEGATIVE
PDW BLD-RTO: 16.1 % (ref 11.5–14.5)
PH UA: 6 (ref 5–9)
PLATELET # BLD: 365 THOU/MM3 (ref 130–400)
PMV BLD AUTO: 7.3 FL (ref 7.4–10.4)
POC CALCIUM: 9 MG/DL (ref 8.5–10.1)
POTASSIUM SERPL-SCNC: 4.3 MEQ/L (ref 3.5–5.1)
PROTEIN UA: NEGATIVE MG/DL
RBC # BLD: 3.61 MILL/MM3 (ref 4.2–5.4)
REFLEX TO URINE C & S: NORMAL
SEGS: 78.5 % (ref 43–75)
SODIUM BLD-SCNC: 136 MEQ/L (ref 136–145)
SPECIFIC GRAVITY UA: 1.02 (ref 1–1.03)
TOTAL PROTEIN: 6.8 GM/DL (ref 6.4–8.2)
TROPONIN I: < 0.017 NG/ML (ref 0.02–0.05)
UROBILINOGEN, URINE: 1 EU/DL (ref 0–1)
WBC # BLD: 8.5 THOU/MM3 (ref 4.8–10.8)

## 2019-01-01 PROCEDURE — 51701 INSERT BLADDER CATHETER: CPT

## 2019-01-01 PROCEDURE — 2709999900 HC NON-CHARGEABLE SUPPLY

## 2019-01-01 PROCEDURE — 99285 EMERGENCY DEPT VISIT HI MDM: CPT

## 2019-01-01 PROCEDURE — 84484 ASSAY OF TROPONIN QUANT: CPT

## 2019-01-01 PROCEDURE — 71046 X-RAY EXAM CHEST 2 VIEWS: CPT

## 2019-01-01 PROCEDURE — 70450 CT HEAD/BRAIN W/O DYE: CPT

## 2019-01-01 PROCEDURE — 2580000003 HC RX 258: Performed by: FAMILY MEDICINE

## 2019-01-01 PROCEDURE — 36415 COLL VENOUS BLD VENIPUNCTURE: CPT

## 2019-01-01 PROCEDURE — 85025 COMPLETE CBC W/AUTO DIFF WBC: CPT

## 2019-01-01 PROCEDURE — 80053 COMPREHEN METABOLIC PANEL: CPT

## 2019-01-01 PROCEDURE — 81001 URINALYSIS AUTO W/SCOPE: CPT

## 2019-01-01 RX ORDER — MORPHINE SULFATE 100 MG/5ML
5 SOLUTION ORAL
Qty: 30 ML | Refills: 0 | Status: SHIPPED | OUTPATIENT
Start: 2019-01-01 | End: 2019-07-25

## 2019-01-01 RX ORDER — 0.9 % SODIUM CHLORIDE 0.9 %
500 INTRAVENOUS SOLUTION INTRAVENOUS ONCE
Status: COMPLETED | OUTPATIENT
Start: 2019-01-01 | End: 2019-01-01

## 2019-01-01 RX ORDER — LORAZEPAM 0.5 MG/1
TABLET ORAL
Qty: 40 TABLET | Refills: 0 | Status: SHIPPED | OUTPATIENT
Start: 2019-01-01 | End: 2019-07-25

## 2019-01-01 RX ORDER — POTASSIUM CHLORIDE 750 MG/1
8 TABLET, EXTENDED RELEASE ORAL DAILY
COMMUNITY

## 2019-01-01 RX ADMIN — SODIUM CHLORIDE 500 ML: 9 INJECTION, SOLUTION INTRAVENOUS at 14:33

## 2019-01-01 ASSESSMENT — ENCOUNTER SYMPTOMS
COUGH: 0
SHORTNESS OF BREATH: 0
WHEEZING: 0
CHEST TIGHTNESS: 0
EYE DISCHARGE: 0
VOMITING: 0
SORE THROAT: 0
PHOTOPHOBIA: 0
CONSTIPATION: 0
EYE REDNESS: 0
COLOR CHANGE: 0
NAUSEA: 0
BACK PAIN: 0
RHINORRHEA: 0
DIARRHEA: 0
STRIDOR: 0
VOICE CHANGE: 0
FACIAL SWELLING: 0
ABDOMINAL DISTENTION: 0
EYE PAIN: 0
ABDOMINAL PAIN: 0

## 2019-05-28 NOTE — ED NOTES
Daughters state they had a call from the home health aide stating that their mom was confused today and was incontinent and has been having diarrhea. Presently, pt is alert and oriented times 3, unable to tell me place, resp even and unlabored, skin pale, warm and dry, bilateral pedal edema noted.       Piter Dalton RN  05/28/19 6621

## 2019-05-28 NOTE — ED NOTES
Pt minicathed for clear, yellow urine, urine given to lab, pt tolerated well.       Erinn Rowan RN  05/28/19 7632

## 2019-05-28 NOTE — ED PROVIDER NOTES
nervous/anxious. All other systems reviewed and are negative. PAST MEDICAL HISTORY    has a past medical history of CAD (coronary artery disease) and Thyroid disease. SURGICAL HISTORY      has a past surgical history that includes Coronary angioplasty with stent. CURRENT MEDICATIONS     Previous Medications    ALBUTEROL SULFATE  (90 BASE) MCG/ACT INHALER    Inhale 2 puffs into the lungs every 4 hours as needed for Shortness of Breath    ASPIRIN 81 MG TABLET    Take 81 mg by mouth daily    BUSPIRONE (BUSPAR) 10 MG TABLET    Take 10 mg by mouth 2 times daily     CLOPIDOGREL (PLAVIX) 75 MG TABLET    Take 75 mg by mouth daily    EZETIMIBE (ZETIA) 10 MG TABLET    Take 10 mg by mouth daily    [id number]-[id number] 3-PHYTOSTEROLS (BP VIT 3 PO)    Take 1 capsule by mouth daily    IPRATROPIUM-ALBUTEROL (DUONEB) 0.5-2.5 (3) MG/3ML SOLN NEBULIZER SOLUTION    Inhale 3 mLs into the lungs every 4 hours as needed for Shortness of Breath    LEVOTHYROXINE (SYNTHROID) 25 MCG TABLET    Take 25 mcg by mouth Daily    LORATADINE (CLARITIN) 10 MG CAPSULE    Take 10 mg by mouth daily    MECLIZINE (ANTIVERT) 25 MG TABLET    Take 25 mg by mouth daily as needed    METFORMIN (GLUCOPHAGE) 500 MG TABLET    Take 1,000 mg by mouth 2 times daily (with meals)    NITROGLYCERIN (NITROSTAT) 0.4 MG SL TABLET    Place 0.4 mg under the tongue every 5 minutes as needed for Chest pain up to max of 3 total doses. If no relief after 1 dose, call 911. POTASSIUM CHLORIDE (KLOR-CON M) 10 MEQ EXTENDED RELEASE TABLET    Take 8 mEq by mouth daily    PRAVASTATIN (PRAVACHOL) 80 MG TABLET    Take 80 mg by mouth daily       ALLERGIES     is allergic to sulfa antibiotics and tape [adhesive tape]. FAMILY HISTORY     has no family status information on file. family history is not on file. SOCIAL HISTORY      reports that she has been smoking cigarettes. She has a 93.00 pack-year smoking history.  She has never used smokeless tobacco. She reports that she does not drink alcohol or use drugs. PHYSICAL EXAM     INITIAL VITALS:  height is 5' 6\" (1.676 m) and weight is 103 lb (46.7 kg). Her temperature is 97.9 °F (36.6 °C). Her blood pressure is 158/69 (abnormal) and her pulse is 97. Her respiration is 18 and oxygen saturation is 96%. Physical Exam   Constitutional: She is oriented to person, place, and time. No distress. HENT:   Head: Normocephalic. Right Ear: External ear normal.   Left Ear: External ear normal.   Nose: Nose normal.   Mouth/Throat: Oropharynx is clear and moist. No oropharyngeal exudate. Eyes: Pupils are equal, round, and reactive to light. Conjunctivae are normal. Left eye exhibits no discharge. No scleral icterus. Neck: Normal range of motion. Neck supple. No JVD present. Cardiovascular: Normal rate, regular rhythm and normal heart sounds. No murmur heard. Pulmonary/Chest: Effort normal and breath sounds normal. No respiratory distress. She has no wheezes. Abdominal: Soft. Bowel sounds are normal. She exhibits no mass. There is no tenderness. There is no guarding. Lymphadenopathy:     She has no cervical adenopathy. Neurological: She is alert and oriented to person, place, and time. No cranial nerve deficit. Skin: Skin is warm and dry. No rash noted. Psychiatric: Judgment normal.   Nursing note and vitals reviewed. DIFFERENTIAL DIAGNOSIS:   UTI, ammonia, CVA, TIA    DIAGNOSTIC RESULTS     EKG: All EKG's are interpreted by the Emergency Department Physician who either signs or Co-signs this chart in the absence of a cardiologist.      RADIOLOGY: non-plain film images(s) such as CT, Ultrasound and MRI are read by the radiologist.  Plain radiographic images are visualized and preliminarilyinterpreted by the emergency physician unless otherwise stated below.      CT HEAD WO CONTRAST (Final result)   Result time 05/28/19 15:15:34   Final result by Aleisha Steele MD (05/28/19 15:15:34) otherwise appear clear. Pulmonary vasculature and cardiac-based also what are within normal limits. There is diffuse demineralization of the visualized osseous structures, similar to prior exam. Follow-up                        LABS:   Labs Reviewed   CBC WITH AUTO DIFFERENTIAL - Abnormal; Notable for the following components:       Result Value    RBC 3.61 (*)     Hemoglobin 9.6 (*)     Hematocrit 30.3 (*)     MCH 26.6 (*)     MCHC 31.7 (*)     RDW 16.1 (*)     MPV 7.3 (*)     SEGS 78.5 (*)     Lymphocytes 11.2 (*)     All other components within normal limits   COMPREHENSIVE METABOLIC PANEL - Abnormal; Notable for the following components:    Alkaline Phosphatase 137 (*)     Alb 3.3 (*)     ALT 11 (*)     All other components within normal limits   GLOMERULAR FILTRATION RATE, ESTIMATED - Abnormal; Notable for the following components:    GFR, Estimated 64 (*)     All other components within normal limits   TROPONIN   ANION GAP   URINE RT REFLEX TO CULTURE       EMERGENCY DEPARTMENT COURSE(MDM): Vitals:    Vitals:    19 1356   BP: (!) 158/69   Pulse: 97   Resp: 18   Temp: 97.9 °F (36.6 °C)   SpO2: 96%   Weight: 103 lb (46.7 kg)   Height: 5' 6\" (1.676 m)     INITIAL NIH STROKE SCALE    Time Performed: 2:15 PM     1a. Level of consciousness:  0 - alert; keenly responsive  1b. Level of consciousness questions:  0 - answers both questions correctly  1c. Level of consciousness questions:  0 - performs both tasks correctly  2. Best Gaze:  0 - normal  3. Visual:  0 - no visual loss  4. Facial Palsy:  0 - normal symmetric movement  5a. Motor left arm:  0 - no drift, limb holds 90 (or 45) degrees for full 10 seconds  5b. Motor right arm:  0 - no drift, limb holds 90 (or 45) degrees for full 10 seconds  6a. Motor left le - no drift; leg holds 30 degree position for full 5 seconds  6b. Motor right le - no drift; leg holds 30 degree position for full 5 seconds  7.     Limb Ataxia:  0 - absent  8. Sensory:  0 - normal; no sensory loss  9. Best Language:  0 - no aphasia, normal  10. Dysarthria:  0 - normal  11. Extinction and Inattention:  0 - no abnormality    TOTAL:  0  Arrival patient was alert and oriented. He was confused at the time of the place and time. Patient does have a history of dementia. He responded appropriately to my questions. An NIH scale was performed at bedside and I did not see any deficits. labs and diagnostic studies were ordered. CT of the head without contrast showed no acute findings. Chest x-ray did not show any acute cardiopulmonary processes. Urinalysis was negative and did not indicate infection. CBC showed a white count of 8.5, hemoglobin of 9.6, hematocrit of 30. 3.comprehensive metabolic profile was otherwise unremarkable except for an alk phos of 137. Troponin was negative. the patient was discussed with her daughters at the bedside. I offered further inpatient admission for further evaluation however at this time they're declining. Recommended further follow-up with her PCP in the next couple days. If any changes in sensorium should occur or worsen I recommend return to the ED immediately. CRITICAL CARE:       CONSULTS:  None    PROCEDURES:  None    FINAL IMPRESSION      1. Confusion          DISPOSITION/PLAN   Home. Care instructions provided. Follow up with PCP/ED as needed.     PATIENT REFERRED TO:  Nallely Blankenship MD  [place] [address]  [phone]    Schedule an appointment as soon as possible for a visit in 2 days        DISCHARGE MEDICATIONS:  New Prescriptions    No medications on file       (Please note that portionsof this note were completed with a voice recognition program.  Efforts were made to edit the dictations but occasionally words are mis-transcribed.)    MD Shelton Rowley MD  05/28/19 3016

## 2019-05-28 NOTE — ED NOTES
Pt resting in room, denies any needs, awaiting lab results. Daughters at the bedside.      Archana Pugh RN  05/28/19 5169

## 2019-05-28 NOTE — ED NOTES
Pt resting, remains alert and oriented times 3, resp even and unlabored, skin pale, warm and dry. IV discontinued, bandaid applied. Pt denies any complaints. Pt and daughter given discharge instructions and verbalizes understanding, pt released.      Palomo Patel RN  05/28/19 1300

## 2019-07-08 ENCOUNTER — TELEPHONE (OUTPATIENT)
Dept: FAMILY MEDICINE CLINIC | Age: 84
End: 2019-07-08

## 2023-03-07 NOTE — PROGRESS NOTES
Discharge teaching and instructions for diagnosis/procedure of Primary Spontaneous Pneumothorax, Severe Malnutrition  completed with patient using teachback method. AVS reviewed. Printed prescriptions given to patient. Patient voiced understanding regarding prescriptions, follow up appointments, and care of self at home.  Discharged in a wheelchair to  skilled nursing per family Calcipotriene Counseling:  I discussed with the patient the risks of calcipotriene including but not limited to erythema, scaling, itching, and irritation.